# Patient Record
Sex: FEMALE | Race: WHITE | Employment: UNEMPLOYED | URBAN - METROPOLITAN AREA
[De-identification: names, ages, dates, MRNs, and addresses within clinical notes are randomized per-mention and may not be internally consistent; named-entity substitution may affect disease eponyms.]

---

## 2017-03-17 ENCOUNTER — HOSPITAL ENCOUNTER (OUTPATIENT)
Dept: PULMONOLOGY | Age: 47
Discharge: HOME | End: 2017-03-17
Attending: NURSE PRACTITIONER | Admitting: NURSE PRACTITIONER

## 2017-04-24 PROBLEM — J44.9 MODERATE COPD (CHRONIC OBSTRUCTIVE PULMONARY DISEASE) (HCC): Status: ACTIVE | Noted: 2017-04-24

## 2017-05-30 ASSESSMENT — COPD QUESTIONNAIRES: CAT_SEVERITY: MODERATE

## 2017-05-31 ENCOUNTER — HOSPITAL ENCOUNTER (OUTPATIENT)
Dept: SURGERY | Age: 47
Discharge: HOME | End: 2017-06-29
Attending: SPECIALIST | Admitting: SPECIALIST

## 2017-12-13 ENCOUNTER — OFFICE VISIT (OUTPATIENT)
Dept: PHYSICAL MEDICINE AND REHAB | Age: 47
End: 2017-12-13

## 2017-12-13 DIAGNOSIS — M79.606 LOWER EXTREMITY PAIN, DIFFUSE, UNSPECIFIED LATERALITY: ICD-10-CM

## 2017-12-13 DIAGNOSIS — M79.601 PARESTHESIA AND PAIN OF BOTH UPPER EXTREMITIES: ICD-10-CM

## 2017-12-13 DIAGNOSIS — R20.2 PARESTHESIA AND PAIN OF BOTH UPPER EXTREMITIES: ICD-10-CM

## 2017-12-13 DIAGNOSIS — G56.03 BILATERAL CARPAL TUNNEL SYNDROME: ICD-10-CM

## 2017-12-13 DIAGNOSIS — M54.17 LUMBOSACRAL RADICULOPATHY AT L5: Primary | ICD-10-CM

## 2017-12-13 DIAGNOSIS — M79.602 PARESTHESIA AND PAIN OF BOTH UPPER EXTREMITIES: ICD-10-CM

## 2017-12-13 PROCEDURE — 95886 MUSC TEST DONE W/N TEST COMP: CPT | Performed by: PHYSICAL MEDICINE & REHABILITATION

## 2017-12-13 PROCEDURE — 95912 NRV CNDJ TEST 11-12 STUDIES: CPT | Performed by: PHYSICAL MEDICINE & REHABILITATION

## 2017-12-31 NOTE — PROGRESS NOTES
EMG REPORT     CHIEF COMPLAINT: Pain, numbness and tingling of her arms and legs. HISTORY OF PRESENT ILLNESS: 56 y.o. R hand dominant female with years of 4 extremity numbness, tingling ands aching/stabbing pains. Her symptoms worsen with activity, but can occur at rest. She reports limb cramping at times, but no rashes or limb discoloration. She rated her pain severity as 10/10 \"often\". No recent traumas. No h/o DM or thyroid disorder. PHYSICAL EXAMINATION: Alert. Guarded spine and limb active movements. Give way with most MMT. No atrophy, tremor or clonus. Inconsistent reporting of sensory perception in the limbs. NERVE CONDUCTION STUDIES:     MOTOR         LATENCY NORMAL AMPLITUDE DISTANCE COND. DANG. RIGHT  MEDIAN 4.5 < 4.2 msec 6.8 8 cm 49   LEFT  MEDIAN 4.7 < 4.2 msec 9.4 8 cm >50   RIGHT  ULNAR 3.3 < 4.2 msec 11.3 8 cm 64   LEFT  ULNAR 3.3 < 4.2 msec 9.5 8 cm >50      SENSORY  ORTHODROMIC        LATENCY NORMAL AMPLITUDE DISTANCE   RIGHT MEDIAN 3.5 <2.3 msec 17 10 cm   LEFT  MEDIAN 2.8 < 2.3 msec 29 10 cm   RIGHT  ULNAR 2.6 < 2.3 msec 12 10 cm   LEFT  ULNAR 2.9 < 2.3 msec 9 10 cm      MOTOR         LATENCY NORMAL AMPLITUDE DISTANCE COND. DANG.    R  PERONEAL    < 6.2 msec  8 cm    L  PERONEAL 4.5 < 6.2 msec 3.2 8 cm 44   RIGHT  TIBIAL  < 6.2 msec  8 cm    LEFT TIBIAL 5.3 < 6.2 msec 1.4 8 cm 46   R TIB H REFLEX  < 50 msec      L TIB H REFLEX  < 50 msec         SENSORY  ANTIDROMIC        LATENCY NORMAL AMPLITUDE DISTANCE   R SUP PERONEAL  < 3.6 msec  10 cm   L SUP PERONEAL 4.4 < 3.6 msec 7 10 cm   RIGHT  SURAL 4.0 < 4.0 msec 10 14 cm   LEFT  SURAL 4.2 < 4.0 msec 12 14 cm         RIGHT   LEFT     Insertional Activity Spontaneous  Activity Volutional  MUAP's Insertional Activity Spontaneous  Activity Volutional  MUAP's   Lumbar paraspinals Normal None Normal Increased Occ Polys   Glut Med Normal None Normal Normal None Normal   Rect fem Normal None Normal Normal None Normal   Vast Med Normal None Normal Normal None Normal   Ant Tibialis Normal None Normal Normal None Dec#, Larger   EHL Normal None Normal Normal None Dec #, Larger   FDL Normal None Normal Normal None Dec #, Larger   Gastroc Normal None Normal Normal None Normal   EDB Normal None Normal Normal None Dec #, Larger   1st dors int Normal None Normal Normal None Normal       NEEDLE EMG:      RIGHT   LEFT     Insertional Activity Spontaneous  Activity Volutional  MUAP's Insertional Activity Spontaneous  Activity Volutional  MUAP's   Cerv Parasp Normal None Normal Normal None Normal   Infraspinatus Normal None Normal Normal None Normal   Deltoid   Normal None Normal Normal None Normal   Biceps   Normal None Normal Normal None Normal   Triceps   Normal None Normal Normal None Normal   Pronator Teres Normal None Normal Normal None Normal   Extensor Indices Normal None Normal Normal None Normal   1st Dorsal Interosseus Normal None Normal Normal None Normal   Abductor Pollicis Br. Normal None Normal Normal None Normal       FINDINGS:   EMG of the cervical and lumbar paraspinals demonstrated mild left lumbar paraspinal muscle membrane irritability. No limb positive waves or fibrillations were seen, however. Some motor unit remodeling was encountered throughout the left L5 myotome. The median sensory and motor latencies nearly corrected back to normal when temperature correction calculations were applied. All other latencies, evoked amplitudes and conduction velocities were acceptable. IMPRESSION:      1. Abnormal EMG. There is a chronic and electrically mild left L5 spinal nerve root injury (indolent left L5 radiculopathy). 2. 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 3. No signs of a plexopathy, generalized peripheral neuropathy or primary muscle disease.          Thank you for this interesting referral.

## 2018-01-09 ENCOUNTER — OFFICE VISIT (OUTPATIENT)
Dept: ORTHOPEDIC SURGERY | Age: 48
End: 2018-01-09

## 2018-01-09 VITALS — WEIGHT: 169 LBS | HEIGHT: 69 IN | RESPIRATION RATE: 16 BRPM | BODY MASS INDEX: 25.03 KG/M2

## 2018-01-09 DIAGNOSIS — G56.03 CARPAL TUNNEL SYNDROME, BILATERAL: Primary | ICD-10-CM

## 2018-01-09 DIAGNOSIS — R52 PAIN: ICD-10-CM

## 2018-01-09 PROCEDURE — 99242 OFF/OP CONSLTJ NEW/EST SF 20: CPT | Performed by: PHYSICIAN ASSISTANT

## 2018-01-09 PROCEDURE — 73100 X-RAY EXAM OF WRIST: CPT | Performed by: PHYSICIAN ASSISTANT

## 2018-01-09 RX ORDER — AMITRIPTYLINE HYDROCHLORIDE 25 MG/1
TABLET, FILM COATED ORAL
COMMUNITY
Start: 2018-01-03 | End: 2022-09-30 | Stop reason: ALTCHOICE

## 2018-01-09 ASSESSMENT — ENCOUNTER SYMPTOMS
GASTROINTESTINAL NEGATIVE: 1
BACK PAIN: 1
EYES NEGATIVE: 1
SHORTNESS OF BREATH: 1

## 2018-01-09 NOTE — PROGRESS NOTES
None Normal Normal None Normal         NEEDLE EMG:         RIGHT     LEFT       Insertional Activity Spontaneous  Activity Volutional  MUAP's Insertional Activity Spontaneous  Activity Volutional  MUAP's   Cerv Parasp Normal None Normal Normal None Normal   Infraspinatus Normal None Normal Normal None Normal   Deltoid Normal None Normal Normal None Normal   Biceps Normal None Normal Normal None Normal   Triceps Normal None Normal Normal None Normal   Pronator Teres Normal None Normal Normal None Normal   Extensor Indices Normal None Normal Normal None Normal   1st Dorsal Interosseus Normal None Normal Normal None Normal   Abductor Pollicis Br. Normal None Normal Normal None Normal         FINDINGS:      EMG of the cervical and lumbar paraspinals demonstrated mild left lumbar paraspinal muscle membrane irritability. No limb positive waves or fibrillations were seen, however. Some motor unit remodeling was encountered throughout the left L5 myotome. The median sensory and motor latencies nearly corrected back to normal when temperature correction calculations were applied. All other latencies, evoked amplitudes and conduction velocities were acceptable.        IMPRESSION:                  1. Abnormal EMG. There is a chronic and electrically mild left L5 spinal nerve root injury (indolent left L5 radiculopathy).                  2. 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                  3. No signs of a plexopathy, generalized peripheral neuropathy or primary muscle disease.       {Findings; xray shoulder ortho:64218}  {NUMBERS 1-4:89570} views of the {RIGHT LEFT BILATERAL:10822} {Joints:26427} show {Findings; injury xray:5769::\"no fracture, dislocation, swelling or degenerative changes noted\"}        Impression:  bilateral ***      Plan:    The most likely impression, expected course, diagnostic and treatment options were discussed, will proceed with:  {plan; knee pain:19630}      We greatly appreciate the referral from Martin Bourgeois NP, and will send a copy of this note to her office.

## 2019-06-26 ENCOUNTER — OFFICE VISIT (OUTPATIENT)
Dept: PHYSICAL MEDICINE AND REHAB | Age: 49
End: 2019-06-26
Payer: COMMERCIAL

## 2019-06-26 DIAGNOSIS — R20.2 PARESTHESIA AND PAIN OF BOTH UPPER EXTREMITIES: ICD-10-CM

## 2019-06-26 DIAGNOSIS — M79.602 PARESTHESIA AND PAIN OF BOTH UPPER EXTREMITIES: ICD-10-CM

## 2019-06-26 DIAGNOSIS — M79.601 PARESTHESIA AND PAIN OF BOTH UPPER EXTREMITIES: ICD-10-CM

## 2019-06-26 DIAGNOSIS — G56.03 BILATERAL CARPAL TUNNEL SYNDROME: Primary | ICD-10-CM

## 2019-06-26 PROCEDURE — 95886 MUSC TEST DONE W/N TEST COMP: CPT | Performed by: PHYSICAL MEDICINE & REHABILITATION

## 2019-06-26 PROCEDURE — 95911 NRV CNDJ TEST 9-10 STUDIES: CPT | Performed by: PHYSICAL MEDICINE & REHABILITATION

## 2019-06-26 NOTE — PROGRESS NOTES
EMG REPORT     CHIEF COMPLAINT: Hand and finger numbness with tingling and burning. HISTORY OF PRESENT ILLNESS: 51 y.o. right hand dominant female with about 3 years of intermittent, but recently progressive, hand and finger burning and stinging pain. She denied having significant neck pain radiating into her arms. She gets cramping in the hands and has difficulty sleeping because her right hand burns and stings. She has been dropping things from her right grasp. She has no similar symptoms in the legs. She rated her pain severity is 8/10. She denied having any rashes or limb discoloration. She has a history of Graves' disease with thyroid supplements now. There is no history of diabetes. PHYSICAL EXAMINATION: Alert. Cervical spine active rotation was free and full. Spurling's maneuver was negative. Upper limb reflexes were trace and symmetric. Tinel sign was negative. There was no upper limb weakness noted. Perception to light touch was mildly distorted in the thumb and index finger of both hands, more so on the right. There was no atrophy, tremor or clonus noted. NERVE CONDUCTION STUDIES:     MOTOR         LATENCY NORMAL AMPLITUDE DISTANCE COND. DANG. RIGHT  MEDIAN 4.5 < 4.2 msec 9.5 8 cm 43   LEFT  MEDIAN 4.1 < 4.2 msec 8.4 8 cm >50   RIGHT  ULNAR 2.9 < 4.2 msec 10.4 8 cm 59   LEFT  ULNAR 3.5 < 4.2 msec 7.8 8 cm >50      SENSORY  ORTHODROMIC        LATENCY NORMAL AMPLITUDE DISTANCE   RIGHT MEDIAN 3.2 <2.3 msec 11 10 cm   LEFT  MEDIAN 2.7 < 2.3 msec 18 10 cm   RIGHT  ULNAR 2.4 < 2.3 msec 9 10 cm   LEFT  ULNAR 2.5 < 2.3 msec 18 10 cm       Right dorsal ulnar sensory: dL 2.6 msec, amp 10 microV.         NEEDLE EMG:      RIGHT   LEFT     Insertional Activity Spontaneous  Activity Volutional  MUAP's Insertional Activity Spontaneous  Activity Volutional  MUAP's   Cerv Parasp Normal None Normal Normal None Normal   Infraspinatus Normal None Normal Normal None Normal   Deltoid   Normal None Normal Normal None Normal   Biceps   Normal None Normal Normal None Normal   Triceps   Normal None Normal Normal None Normal   Pronator Teres Normal None Normal Normal None Normal   Extensor Indicis Normal None Normal Normal None Normal   1st Dorsal Interosseus Normal None Normal Normal None Normal   Abductor Pollicis Br. Normal None Dec #, Larger Normal None Polys       FINDINGS:   EMG of the cervical paraspinals in both upper limbs demonstrated no paraspinal nor limb muscle membrane irritability. He diminished number of larger motor units were noted with needle exam of the right APB muscle. Subtle poly-phasicity was seen in the APB muscle of the left hand. Median sensory latency delays are seen at each wrist.  The right median motor latency was delayed in the form conduction velocity was slowed. Ulnar conductions were well-maintained. IMPRESSION:      1. Abnormal EMG. There are median neuropathies at each wrist (R>L mild CTS, more chronic on the right)     2. No evidence of a concurrent spinal nerve root injury (radiculopathy), plexopathy, generalized neuropathy or primary muscle disease.          Thank you for this interesting referral.

## 2019-06-26 NOTE — LETTER
June 26, 2019      Juan Mccain PA-C  [address] [facility], [address] [place] [address],[facility], [address] [facility],[address]        Patient Name: Jose L Olivera   MRN Number: [id number]   YOB: 1970   Date Of Visit: 06/26/2019        Dear Juan Mccain PA-C,       Thank you for referring Jose L Olivera to me for electrodiagnostic testing. Attached are the findings of the EMG and my assessment. If you have any further questions, please do not hesitate to call me. Thank you for this interesting referral.      Sincerely,          AKIRA Fried MD.

## 2019-10-22 ENCOUNTER — HOSPITAL ENCOUNTER (OUTPATIENT)
Dept: PHYSICAL THERAPY | Age: 49
Setting detail: THERAPIES SERIES
Discharge: HOME | End: 2019-10-22
Payer: COMMERCIAL

## 2019-10-22 PROCEDURE — 97112 NEUROMUSCULAR REEDUCATION: CPT | Performed by: PHYSICAL THERAPIST

## 2019-10-22 PROCEDURE — 97110 THERAPEUTIC EXERCISES: CPT | Performed by: PHYSICAL THERAPIST

## 2019-10-22 PROCEDURE — 97161 PT EVAL LOW COMPLEX 20 MIN: CPT | Performed by: PHYSICAL THERAPIST

## 2019-10-22 ASSESSMENT — PAIN DESCRIPTION - LOCATION: LOCATION: BACK

## 2019-10-22 ASSESSMENT — PAIN DESCRIPTION - FREQUENCY: FREQUENCY: CONTINUOUS

## 2019-10-22 ASSESSMENT — PAIN DESCRIPTION - ORIENTATION: ORIENTATION: LEFT

## 2019-10-22 ASSESSMENT — PAIN SCALES - GENERAL: PAINLEVEL_OUTOF10: 8

## 2019-10-22 ASSESSMENT — PAIN DESCRIPTION - DESCRIPTORS: DESCRIPTORS: SHARP;ACHING;NUMBNESS;TINGLING

## 2019-10-22 ASSESSMENT — PAIN DESCRIPTION - PROGRESSION: CLINICAL_PROGRESSION: GRADUALLY WORSENING

## 2019-10-22 ASSESSMENT — PAIN DESCRIPTION - ONSET: ONSET: AWAKENED FROM SLEEP

## 2019-10-22 ASSESSMENT — PAIN DESCRIPTION - PAIN TYPE: TYPE: CHRONIC PAIN

## 2019-10-22 ASSESSMENT — PAIN - FUNCTIONAL ASSESSMENT: PAIN_FUNCTIONAL_ASSESSMENT: PREVENTS OR INTERFERES WITH MANY ACTIVE NOT PASSIVE ACTIVITIES

## 2019-10-28 ENCOUNTER — HOSPITAL ENCOUNTER (OUTPATIENT)
Dept: PHYSICAL THERAPY | Age: 49
Discharge: HOME | End: 2019-10-28

## 2019-11-04 ENCOUNTER — HOSPITAL ENCOUNTER (OUTPATIENT)
Dept: PHYSICAL THERAPY | Age: 49
Setting detail: THERAPIES SERIES
Discharge: HOME | End: 2019-11-04
Payer: COMMERCIAL

## 2019-11-04 PROCEDURE — 97140 MANUAL THERAPY 1/> REGIONS: CPT

## 2019-11-04 PROCEDURE — 97110 THERAPEUTIC EXERCISES: CPT

## 2019-11-18 ENCOUNTER — HOSPITAL ENCOUNTER (OUTPATIENT)
Dept: PHYSICAL THERAPY | Age: 49
Discharge: HOME | End: 2019-11-18

## 2019-11-22 ENCOUNTER — HOSPITAL ENCOUNTER (OUTPATIENT)
Dept: PHYSICAL THERAPY | Age: 49
Discharge: HOME | End: 2019-11-22

## 2019-11-25 ENCOUNTER — HOSPITAL ENCOUNTER (OUTPATIENT)
Dept: PHYSICAL THERAPY | Age: 49
Setting detail: THERAPIES SERIES
Discharge: HOME | End: 2019-11-25
Payer: COMMERCIAL

## 2019-11-25 PROCEDURE — 97110 THERAPEUTIC EXERCISES: CPT

## 2019-11-25 PROCEDURE — 97530 THERAPEUTIC ACTIVITIES: CPT

## 2019-11-25 PROCEDURE — 97140 MANUAL THERAPY 1/> REGIONS: CPT

## 2019-12-02 ENCOUNTER — HOSPITAL ENCOUNTER (OUTPATIENT)
Dept: PHYSICAL THERAPY | Age: 49
Discharge: HOME | End: 2019-12-02

## 2019-12-06 ENCOUNTER — HOSPITAL ENCOUNTER (OUTPATIENT)
Dept: PHYSICAL THERAPY | Age: 49
Setting detail: THERAPIES SERIES
Discharge: HOME | End: 2019-12-06
Payer: COMMERCIAL

## 2019-12-06 PROCEDURE — 97113 AQUATIC THERAPY/EXERCISES: CPT

## 2019-12-12 ENCOUNTER — HOSPITAL ENCOUNTER (OUTPATIENT)
Dept: PHYSICAL THERAPY | Age: 49
Setting detail: THERAPIES SERIES
Discharge: HOME | End: 2019-12-12
Payer: COMMERCIAL

## 2019-12-12 PROCEDURE — 97110 THERAPEUTIC EXERCISES: CPT

## 2019-12-13 ENCOUNTER — HOSPITAL ENCOUNTER (OUTPATIENT)
Dept: PHYSICAL THERAPY | Age: 49
Setting detail: THERAPIES SERIES
Discharge: HOME | End: 2019-12-13
Payer: COMMERCIAL

## 2019-12-13 PROCEDURE — 97113 AQUATIC THERAPY/EXERCISES: CPT

## 2019-12-16 ENCOUNTER — HOSPITAL ENCOUNTER (OUTPATIENT)
Dept: PHYSICAL THERAPY | Age: 49
Setting detail: THERAPIES SERIES
Discharge: HOME | End: 2019-12-16
Payer: COMMERCIAL

## 2019-12-16 PROCEDURE — 97140 MANUAL THERAPY 1/> REGIONS: CPT

## 2019-12-16 PROCEDURE — 97110 THERAPEUTIC EXERCISES: CPT

## 2019-12-16 PROCEDURE — 97112 NEUROMUSCULAR REEDUCATION: CPT

## 2019-12-23 ENCOUNTER — HOSPITAL ENCOUNTER (OUTPATIENT)
Dept: PHYSICAL THERAPY | Age: 49
Discharge: HOME | End: 2019-12-23

## 2019-12-30 ENCOUNTER — HOSPITAL ENCOUNTER (OUTPATIENT)
Dept: PHYSICAL THERAPY | Age: 49
Discharge: HOME | End: 2019-12-30

## 2020-01-06 ENCOUNTER — HOSPITAL ENCOUNTER (OUTPATIENT)
Dept: PHYSICAL THERAPY | Age: 50
Discharge: HOME | End: 2020-01-06

## 2020-06-07 NOTE — PROGRESS NOTES
Zoomed and updated mother   circumstances is:  [x]Appropriate   []Inappropriate    Gait and Station:   Gait is:  [x]Normal  []Antalgic   []Trendelenburg   []Wide   []Unsteady   []Other:  Assistive Device:  []Cane    []Crutches    []Walker    []Wheelchair    []Gurney  Weight bearing on injured extremity:  [x]Is able   []Is not able      ORTHOPEDIC WRIST EXAM:     WRIST EXAM:  [x]Right []Left  The patient is:  []Right Handed    []Left Handed  Circulation:  [x] The limb is warm and well perfused. [x] Capillary refill is intact. [] Edema:      Inspection:  [x] Skin intact without abrasion or lacerations. [x] Normal wrist alignment:  [] Abnormal alignment:  [] Ecchymosis:  [] Abrasion:  [] Laceration:   [] Scar / Surgical incision:  [] Orthopedic appliance:     Range of Motion:  [x] Normal Wrist AROM     [x] Normal Wrist PROM  [x]Can make a fist    [x]Thumb tip can touch pinky tip  [] Deferred due to fracture  Active Wrist Flexion:  []AROM = PROM   Active Wrist Extension:  []AROM = PROM   Active Radial Deviation:  []AROM = PROM   Active Ulnar Deviation:  []AROM = PROM   Active Wrist Supination  []AROM = PROM   Active Wrist Pronation  []AROM = PROM   Passive Wrist Flexion:  []AROM = PROM   Passive Wrist Extension:  []AROM = PROM   Passive Radial Deviation:  []AROM = PROM   Passive Ulnar Deviation:  []AROM = PROM   Passive Wrist Supination:  []AROM = PROM   Passive Wrist Pronation:  []AROM = PROM     Motor Function:  [x] No gross motor weakness of wrist [x] No gross motor weakness of hand  [x]Thumbs Up    [x]Pointer finger    [x]OK sign    [x]Cross fingers    [x]Number 5  [] Motor weakness:      Neurologic:  [x] Sensation to light touch intact. [] Impaired:  [] Decreased sensation to light touch over median nerve distribution. [] Decreased sensation to light touch over ulnar nerve distribution. [x] Deep tendon reflexes intact. [] Impaired:  [x] Coordination / proprioception intact.   [] Impaired:     Palpation: (Not tested if not marked) Normal Tenderness Swelling Crepitation Calor   Circumferential Wrist: [x] [] [] [] []   Distal Radius: [x] [] [] [] []   Eric's Tubercle: [] [] [] [] []   Radial Styloid: [x] [] [] [] []   First Dorsal Compartment: [] [] [] [] []   Distal Ulna: [x] [] [] [] []   Ulna Styloid: [x] [] [] [] []   TFCC: [] [] [] [] []   DRUJ: [] [] [] [] []   Snuff Box: [] [] [] [] []   Carpal: [] [x] [] [] []   Metacarpal: [] [] [] [] []   Other: [] [] [] [] []   Other: [] [] [] [] []     Comment:     Provocative Tests: (Not tested if not marked)   Negative Positive Positive Findings   DRUJ Piano Chavarria Sign: [] []    Tinel's Sign [x] []    Phalen's Sign: [] [x]    Carpal Compression: [] [x]    TFC Compression Test: [] []    Thumb CMC Grind Test: [] []    Finklestein's test: [] []    Froment's sign: [] []    Other: [] []        MEDICAL DATA:   Outside record review:  EMG report from Dr. Mikael Keyes MD.    Imaging:  Wrist x-ray:  2 view(s) of the right wrist were obtained and reviewed and show Right Wrist: No fracture, Normal alignment, No foreign body                                                                                                                                                                                                           ORTHOPEDIC WRIST EXAM:     WRIST EXAM:  []Right [x]Left  The patient is:  []Right Handed    []Left Handed  Circulation:  [x] The limb is warm and well perfused. [x] Capillary refill is intact. [] Edema:      Inspection:  [x] Skin intact without abrasion or lacerations.   [x] Normal wrist alignment:  [] Abnormal alignment:  [] Ecchymosis:  [] Abrasion:  [] Laceration:   [] Scar / Surgical incision:  [] Orthopedic appliance:     Range of Motion:  [x] Normal Wrist AROM     [x] Normal Wrist PROM  [x]Can make a fist    [x]Thumb tip can touch pinky tip  [] Deferred due to fracture  Active Wrist Flexion:  []AROM = PROM   Active Wrist Extension:  []AROM = PROM   Active Radial Deviation:  []AROM msec 6.8 8 cm 49   LEFT  MEDIAN 4.7 < 4.2 msec 9.4 8 cm >50   RIGHT  ULNAR 3.3 < 4.2 msec 11.3 8 cm 64   LEFT  ULNAR 3.3 < 4.2 msec 9.5 8 cm >50       SENSORY  ORTHODROMIC             LATENCY NORMAL AMPLITUDE DISTANCE   RIGHT MEDIAN 3.5 <2.3 msec 17 10 cm   LEFT  MEDIAN 2.8 < 2.3 msec 29 10 cm   RIGHT  ULNAR 2.6 < 2.3 msec 12 10 cm   LEFT  ULNAR 2.9 < 2.3 msec 9 10 cm       MOTOR               LATENCY NORMAL AMPLITUDE DISTANCE COND. DANG.    R  PERONEAL     < 6.2 msec   8 cm     L  PERONEAL 4.5 < 6.2 msec 3.2 8 cm 44   RIGHT  TIBIAL   < 6.2 msec   8 cm     LEFT TIBIAL 5.3 < 6.2 msec 1.4 8 cm 46   R TIB H REFLEX   < 50 msec         L TIB H REFLEX   < 50 msec             SENSORY  ANTIDROMIC             LATENCY NORMAL AMPLITUDE DISTANCE   R SUP PERONEAL   < 3.6 msec   10 cm   L SUP PERONEAL 4.4 < 3.6 msec 7 10 cm   RIGHT  SURAL 4.0 < 4.0 msec 10 14 cm   LEFT  SURAL 4.2 < 4.0 msec 12 14 cm             RIGHT     LEFT       Insertional Activity Spontaneous  Activity Volutional  MUAP's Insertional Activity Spontaneous  Activity Volutional  MUAP's   Lumbar paraspinals Normal None Normal Increased Occ Polys   Glut Med Normal None Normal Normal None Normal   Rect fem Normal None Normal Normal None Normal   Vast Med Normal None Normal Normal None Normal   Ant Tibialis Normal None Normal Normal None Dec#, Larger   EHL Normal None Normal Normal None Dec #, Larger   FDL Normal None Normal Normal None Dec #, Larger   Gastroc Normal None Normal Normal None Normal   EDB Normal None Normal Normal None Dec #, Larger   1st dors int Normal None Normal Normal None Normal         NEEDLE EMG:         RIGHT     LEFT       Insertional Activity Spontaneous  Activity Volutional  MUAP's Insertional Activity Spontaneous  Activity Volutional  MUAP's   Cerv Parasp Normal None Normal Normal None Normal   Infraspinatus Normal None Normal Normal None Normal   Deltoid Normal None Normal Normal None Normal   Biceps Normal None Normal Normal None Normal Triceps Normal None Normal Normal None Normal   Pronator Teres Normal None Normal Normal None Normal   Extensor Indices Normal None Normal Normal None Normal   1st Dorsal Interosseus Normal None Normal Normal None Normal   Abductor Pollicis Br. Normal None Normal Normal None Normal         FINDINGS:      EMG of the cervical and lumbar paraspinals demonstrated mild left lumbar paraspinal muscle membrane irritability. No limb positive waves or fibrillations were seen, however. Some motor unit remodeling was encountered throughout the left L5 myotome. The median sensory and motor latencies nearly corrected back to normal when temperature correction calculations were applied. All other latencies, evoked amplitudes and conduction velocities were acceptable.        IMPRESSION:                  1. Abnormal EMG. There is a chronic and electrically mild left L5 spinal nerve root injury (indolent left L5 radiculopathy).                  2. 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                3. No signs of a plexopathy, generalized peripheral neuropathy or primary muscle disease.            Imaging:  Wrist x-ray:  2 view(s) of the left wrist were obtained and reviewed and show Left Wrist: No fracture, Normal alignment, No foreign body      ASSESSMENT:     1. Carpal tunnel syndrome, bilateral     2. Pain  XR WRIST RIGHT (2 VIEWS)    XR WRIST LEFT (MIN 3 VIEWS)         PLAN:   · Diagnostic and treatment options discussed. Diagnosis explained. Natural history discussed. Patient's questions answered. · Night braces fitted and applied. · Continue Motrin as previously prescribed. · Follow-up in 1 month for recheck. We greatly appreciate the referral from Elana Martin CNP, and will send a copy of this note to her office.       Jose Martin Seals PA-C

## 2021-08-09 RX ORDER — PROGESTERONE 200 MG/1
CAPSULE ORAL
Qty: 30 CAPSULE | Refills: 0 | Status: SHIPPED | OUTPATIENT
Start: 2021-08-09 | End: 2021-09-07 | Stop reason: SDUPTHER

## 2021-08-09 RX ORDER — ESTRADIOL 1 MG/1
1 TABLET ORAL DAILY
Qty: 30 TABLET | Refills: 0 | Status: SHIPPED | OUTPATIENT
Start: 2021-08-09 | End: 2021-09-07 | Stop reason: SDUPTHER

## 2021-08-09 RX ORDER — PROGESTERONE 200 MG/1
CAPSULE ORAL
COMMUNITY
Start: 2021-07-10 | End: 2021-08-09 | Stop reason: SDUPTHER

## 2021-08-09 RX ORDER — ESTRADIOL 1 MG/1
1 TABLET ORAL DAILY
COMMUNITY
Start: 2021-08-09 | End: 2021-08-09 | Stop reason: SDUPTHER

## 2021-08-09 NOTE — TELEPHONE ENCOUNTER
Pt requesting 1 time refills on Estradiol and Progesterone to get her through until her REBECCA 9/7/21.

## 2021-09-07 ENCOUNTER — OFFICE VISIT (OUTPATIENT)
Dept: OBGYN | Age: 51
End: 2021-09-07
Payer: COMMERCIAL

## 2021-09-07 ENCOUNTER — HOSPITAL ENCOUNTER (OUTPATIENT)
Age: 51
Setting detail: SPECIMEN
Discharge: HOME | End: 2021-09-07
Payer: COMMERCIAL

## 2021-09-07 VITALS
WEIGHT: 159 LBS | HEIGHT: 69 IN | SYSTOLIC BLOOD PRESSURE: 130 MMHG | BODY MASS INDEX: 23.55 KG/M2 | DIASTOLIC BLOOD PRESSURE: 86 MMHG | HEART RATE: 76 BPM

## 2021-09-07 DIAGNOSIS — N95.1 MENOPAUSAL SYMPTOMS: ICD-10-CM

## 2021-09-07 DIAGNOSIS — Z01.419 WOMEN'S ANNUAL ROUTINE GYNECOLOGICAL EXAMINATION: Primary | ICD-10-CM

## 2021-09-07 DIAGNOSIS — Z87.410 HISTORY OF CERVICAL DYSPLASIA: ICD-10-CM

## 2021-09-07 DIAGNOSIS — Z11.51 SPECIAL SCREENING EXAMINATION FOR HUMAN PAPILLOMAVIRUS (HPV): ICD-10-CM

## 2021-09-07 DIAGNOSIS — Z12.31 SCREENING MAMMOGRAM, ENCOUNTER FOR: ICD-10-CM

## 2021-09-07 PROCEDURE — 99396 PREV VISIT EST AGE 40-64: CPT | Performed by: OBSTETRICS & GYNECOLOGY

## 2021-09-07 PROCEDURE — 88142 CYTOPATH C/V THIN LAYER: CPT

## 2021-09-07 PROCEDURE — 87624 HPV HI-RISK TYP POOLED RSLT: CPT

## 2021-09-07 RX ORDER — ESTRADIOL 1 MG/1
1 TABLET ORAL DAILY
Qty: 90 TABLET | Refills: 3 | Status: SHIPPED | OUTPATIENT
Start: 2021-09-07 | End: 2022-09-01 | Stop reason: SDUPTHER

## 2021-09-07 RX ORDER — PROGESTERONE 200 MG/1
CAPSULE ORAL
Qty: 90 CAPSULE | Refills: 3 | Status: SHIPPED | OUTPATIENT
Start: 2021-09-07 | End: 2022-09-01 | Stop reason: SDUPTHER

## 2021-09-07 RX ORDER — LOSARTAN POTASSIUM 50 MG/1
50 TABLET ORAL DAILY
COMMUNITY

## 2021-09-07 RX ORDER — LEVOTHYROXINE SODIUM 0.12 MG/1
125 TABLET ORAL DAILY
COMMUNITY
End: 2022-09-30 | Stop reason: ALTCHOICE

## 2021-09-07 SDOH — ECONOMIC STABILITY: FOOD INSECURITY: WITHIN THE PAST 12 MONTHS, YOU WORRIED THAT YOUR FOOD WOULD RUN OUT BEFORE YOU GOT MONEY TO BUY MORE.: NEVER TRUE

## 2021-09-07 SDOH — ECONOMIC STABILITY: FOOD INSECURITY: WITHIN THE PAST 12 MONTHS, THE FOOD YOU BOUGHT JUST DIDN'T LAST AND YOU DIDN'T HAVE MONEY TO GET MORE.: NEVER TRUE

## 2021-09-07 ASSESSMENT — PATIENT HEALTH QUESTIONNAIRE - PHQ9
SUM OF ALL RESPONSES TO PHQ QUESTIONS 1-9: 0
SUM OF ALL RESPONSES TO PHQ QUESTIONS 1-9: 0
1. LITTLE INTEREST OR PLEASURE IN DOING THINGS: 0
SUM OF ALL RESPONSES TO PHQ QUESTIONS 1-9: 0
2. FEELING DOWN, DEPRESSED OR HOPELESS: 0
SUM OF ALL RESPONSES TO PHQ9 QUESTIONS 1 & 2: 0

## 2021-09-07 ASSESSMENT — ENCOUNTER SYMPTOMS
EYES NEGATIVE: 1
GASTROINTESTINAL NEGATIVE: 1
ALLERGIC/IMMUNOLOGIC NEGATIVE: 1
RESPIRATORY NEGATIVE: 1

## 2021-09-07 ASSESSMENT — SOCIAL DETERMINANTS OF HEALTH (SDOH): HOW HARD IS IT FOR YOU TO PAY FOR THE VERY BASICS LIKE FOOD, HOUSING, MEDICAL CARE, AND HEATING?: NOT HARD AT ALL

## 2021-09-07 NOTE — PROGRESS NOTES
9/7/21    [facility]  1970    Chief Complaint   Patient presents with    Gynecologic Exam     annual exam, postmenopausal on Estradiol, currently sexually active, pap 2020 negative, hx. of dysplasia cryo 2017, mammo 2019 negative, never had a dexa, colonoscopy 2015 normal. no c/o . ns        The patient is a 49 y.o. female, No obstetric history on file. who presents for her annual exam.  She is menopausal.  She is taking HRT, estradiol 1mg. and prometrium 200mg She is  sexually active. She reports no gynecological symptoms. Pap smear history: Her last PAP smear was in 2019. Her results were normal.    Breast history: her most recent mammogram was in 2020. The results were: Normal    Osteoporosis Status: she has not had a bone density scan    Colonoscopy Status: she had a colonoscopy in 6 years prior. The results were normal.    Past Medical History:   Diagnosis Date    Abnormal Pap smear of cervix     mild dysplasia, cryo 2017     Arthritis     back    COPD (chronic obstructive pulmonary disease) (HCC)     Fibromyalgia     Graves disease     Hypertension        Past Surgical History:   Procedure Laterality Date    COLONOSCOPY      GYNECOLOGIC CRYOSURGERY      THYROIDECTOMY      TUBAL LIGATION         No family history on file.     Social History     Tobacco Use    Smoking status: Current Every Day Smoker     Packs/day: 1.00     Last attempt to quit: 4/18/2018     Years since quitting: 3.3    Smokeless tobacco: Never Used   Substance Use Topics    Alcohol use: Yes     Comment: occas    Drug use: Not on file       Current Outpatient Medications   Medication Sig Dispense Refill    losartan (COZAAR) 50 MG tablet Take 50 mg by mouth daily      levothyroxine (SYNTHROID) 125 MCG tablet Take 125 mcg by mouth Daily      estradiol (ESTRACE) 1 MG tablet Take 1 tablet by mouth daily 90 tablet 3    progesterone (PROMETRIUM) 200 MG CAPS capsule TAKE 1 CAPSULE BY MOUTH ONCE DAILY 90 capsule 3    DULoxetine (CYMBALTA) 30 MG extended release capsule Take 30 mg by mouth daily      fluticasone (FLOVENT HFA) 110 MCG/ACT inhaler Inhale 2 puffs into the lungs 2 times daily 1 Inhaler 5    fluticasone (FLOVENT HFA) 110 MCG/ACT inhaler Inhale 2 puffs into the lungs 2 times daily 1 Inhaler 5    cyclobenzaprine (FLEXERIL) 5 MG tablet Take 5 mg by mouth 3 times daily as needed for Muscle spasms      ibuprofen (ADVIL;MOTRIN) 800 MG tablet Take 800 mg by mouth every 6 hours as needed for Pain      albuterol sulfate HFA (VENTOLIN HFA) 108 (90 Base) MCG/ACT inhaler Inhale 2 puffs into the lungs every 6 hours (Patient not taking: Reported on 9/7/2021) 1 Inhaler 5    amitriptyline (ELAVIL) 25 MG tablet  (Patient not taking: Reported on 9/7/2021)      norethindrone (JUAN MIGUEL) 0.35 MG tablet Take 1 tablet by mouth daily (Patient not taking: Reported on 9/7/2021)      albuterol sulfate HFA (VENTOLIN HFA) 108 (90 Base) MCG/ACT inhaler Inhale 2 puffs into the lungs every 6 hours as needed for Wheezing  (Patient not taking: Reported on 9/7/2021)      methocarbamol (ROBAXIN) 750 MG tablet Take 750 mg by mouth 4 times daily (Patient not taking: Reported on 9/7/2021)       No current facility-administered medications for this visit. No Known Allergies    No obstetric history on file. There is no immunization history on file for this patient. Review of Systems   Constitutional: Negative. Eyes: Negative. Respiratory: Negative. Cardiovascular: Negative. Gastrointestinal: Negative. Endocrine: Negative. Genitourinary: Negative. Musculoskeletal: Negative. Skin: Negative. Allergic/Immunologic: Negative. Neurological: Negative. Hematological: Negative. Psychiatric/Behavioral: Negative. /86   Pulse 76   Ht 5' 9\" (1.753 m)   Wt 159 lb (72.1 kg)   BMI 23.48 kg/m²     Physical Exam  Exam conducted with a chaperone present.    Constitutional:       Appearance: Normal appearance. HENT:      Head: Normocephalic and atraumatic. Nose: Nose normal.   Eyes:      Conjunctiva/sclera: Conjunctivae normal.   Cardiovascular:      Rate and Rhythm: Normal rate. Pulses: Normal pulses. Pulmonary:      Effort: Pulmonary effort is normal.   Chest:      Breasts:         Right: Normal.         Left: Normal.   Abdominal:      General: Abdomen is flat. Bowel sounds are normal.      Palpations: Abdomen is soft. Hernia: There is no hernia in the left inguinal area or right inguinal area. Genitourinary:     General: Normal vulva. Exam position: Lithotomy position. Labia:         Right: No rash, tenderness or lesion. Left: No rash, tenderness or lesion. Urethra: No prolapse. Vagina: Normal. No foreign body. No vaginal discharge, erythema, tenderness, bleeding, lesions or prolapsed vaginal walls. Cervix: No cervical motion tenderness, discharge, friability, lesion, erythema or cervical bleeding. Uterus: Normal. Not enlarged, not tender and no uterine prolapse. Adnexa: Right adnexa normal and left adnexa normal.        Right: No mass, tenderness or fullness. Left: No mass, tenderness or fullness. Musculoskeletal:      Cervical back: Normal range of motion and neck supple. Lymphadenopathy:      Upper Body:      Right upper body: No supraclavicular, axillary or pectoral adenopathy. Left upper body: No supraclavicular, axillary or pectoral adenopathy. Skin:     General: Skin is warm. Coloration: Skin is not ashen or cyanotic. Findings: No abrasion, abscess or bruising. Rash is not crusting or macular. Neurological:      Mental Status: She is alert and oriented to person, place, and time. No results found for this visit on 09/07/21. Assessment and Plan   Diagnosis Orders   1. Women's annual routine gynecological examination  PAP SMEAR   2.  Special screening examination for human papillomavirus (HPV) PAP SMEAR   3. History of cervical dysplasia  PAP SMEAR   4. Menopausal symptoms  estradiol (ESTRACE) 1 MG tablet    progesterone (PROMETRIUM) 200 MG CAPS capsule   5. Screening mammogram, encounter for  PARTHA DIGITAL SCREEN W OR WO CAD BILATERAL     Diet and exercise  Risk of hrt including breast cancer and vte  Return in about 1 year (around 9/7/2022).     Magalie Hernandez MD

## 2021-09-07 NOTE — PATIENT INSTRUCTIONS
Hormone Replacement Therapy (HRT)       Hormone replacement therapy can be either estrogen alone (called estrogen replacement therapy, or ERT), or estrogen and progesterone combined. This combination is referred to as hormone replacement therapy (HRT). Progesterone is usually given in the form of progestins, which are synthetic forms of the naturally occurring hormone progesterone. While once widely used, HRT now has a more limited role because of concerns about its safety. Medications and Their Commonly Used Names   Estrogen is most commonly given in these forms:   Pill or tablet   Vaginal cream   Vaginal ring insert   Patch   Skin gel   Progestin is available in these forms:   Pill (can be combined with estrogen)   Intrauterine device (IUD)   Vaginal capsule   Injection   Implant   Skin gel   What This Medication Is Prescribed For   To Ease Menopausal Symptoms   Hot flashes   Sleep disturbance   Vaginal dryness   To Reduce the Risk of Osteoporosis and Related Fractures   Estrogen is important for bone health. When the natural supply of estrogen drops off with menopause, HRT can help protect bones by replacing estrogen. How This Medication Works   The hormones provided with HRT are meant to replace the natural hormones that a woman's body no longer produces after menopause. Estrogen is involved in many functions in the body, and therefore, HRT is believed to provide the following benefits:   Reduce the symptoms of menopause   Helps to slow or prevent the bone loss that occurs with aging and increases after menopause, in order to help delay osteoporosis   Helps to reduce the risk of colorectal cancer   Precautions While Using This Medication   Long-term use of HRT (estrogen plus progestin) may significantly increase women's risks of breast cancer , strokes , heart attacks , and blood clots. ERT may also increase the risk of ovarian cancer .  HRT has been associated with an increased risk of gastroesophageal reflux disease (GERD). For many women the risks of HRTespecially when used long-termmay outweigh the benefits, so the decision to use HRT should be carefully considered and discussed with your healthcare provider. Women with the following conditions are usually advised not to take HRT:   Unexplained vaginal bleeding   Liver disease   Kidney disease   High levels of triglycerides (a type of fat in the blood)   History of blood clots in the veins   History of breast or ovarian cancer   History of cardiovascular disease   History of stroke   many other conditions (ask your doctor if any of your medical conditions increase the risks of taking HRT)   Proper Usage and Missed Dose   You and your doctor will determine the dosing schedule that is best for you. You should see your doctor at least once a year while taking HRT to discuss the effects and review your decision. The U[place] Food and Drug Administration (FDA) recommends that women who decide to use HRT for menopausal symptoms use the lowest possible dose for the shortest time needed. There are two general schedules for taking HRT in pill form:   Cyclic or sequentialEstrogen is taken every day for a set number of days. A higher dose (than that used in continuous doses) of progestin is given for 10-14 days. One or both hormones are stopped for a specified period of time. This pattern is repeated every month, and it causes regular monthly bleeding like a light menstrual period. ContinuousLow-dose estrogen and progestin are taken together every day of the month without any break. Vaginal bleeding often occurs, sometimes for up to a year when this schedule is first started, and can vary from light spotting to irregular menstrual-type bleeding. Take estrogen at the same time every day to minimize side effects. If you are using an estrogen skin patch, be sure to read the application directions carefully before using.    Missed Dose   Pill formIf you miss a dose, take it as soon as possible. However, if it is almost time for your next dose, skip the missed dose and go back to your regular dosing schedule. Do not double doses. Skin patchIf you forget to apply a new patch when you are supposed to, apply it as soon as possible. However, if it is almost time for the next patch, skip the missed one and go back to your regular schedule. Always remove the old patch before applying a new one. Do not apply more than one patch at a time. Possible Side Effects   The following side effects may disappear over time as your body adjusts to taking HRT. Also, your doctor may be able to change the amount of hormone you receive, the way it is taken, or the timing of the dose, in order to help minimize these effects:   Bloating   Breast tenderness   Cramping   Irritability   Depression   Return of monthly periods   Swelling of feet and lower legs   Rapid weight gain   HRT can also cause some very serious side effects. You should discuss your specific health status and risks with your doctor when deciding whether or not to use HRT. These serious side effects include the following:   Endometrial Cancer   For a woman who has not had her uterus removed (via a hysterectomy ), taking estrogen alone (ERT) can lead to cancer of the endometrium (the lining of the uterus). However, this risk can be avoided by taking both estrogen and progestin in the form of HRT. A woman who has had her uterus removed cannot develop endometrial cancer so she can take ERT. Breast Cancer   Some studies have suggested that women who take HRT and ERT are at greater risk for developing breast cancer. A major study on HRT, the [facility], found that invasive breast cancer was more common among women on long-term HRT. Blood Clots   Both HRT and ERT slightly increase the risk of developing blood clots in veins.  In the [facility] study, women who were long-term users of HRT had twice the number of blood clots as the women who were not taking HRT. Cardiovascular Disease   Although HRT was previously believed to reduce the risk of cardiovascular disease, it appears that long-term use of HRT may actually increase this risk. In the [facility] study on HRT, women on long-term HRT had increased risk of heart disease and strokes. Ovarian Cancer   Women on ERT may be at higher risk of ovarian cancer. Other Uses for This Medication   To treat the following:   Osteoporosis caused by lack of estrogen before menopause   Moran's syndrome (a genetic disease)   With every medicine, there are important precautions to consider. These include allergies, interactions with other drugs and medical conditions, and safety in certain age groups.

## 2021-09-10 LAB
HPV HIGH RISK: NOT DETECTED
HPV, GENOTYPE 16: NOT DETECTED
HPV, GENOTYPE 18: NOT DETECTED

## 2022-08-29 ASSESSMENT — PAIN DESCRIPTION - LOCATION: LOCATION: BACK

## 2022-08-29 ASSESSMENT — PAIN DESCRIPTION - PAIN TYPE: TYPE: CHRONIC PAIN

## 2022-08-30 ENCOUNTER — HOSPITAL ENCOUNTER (OUTPATIENT)
Dept: PHYSICAL THERAPY | Age: 52
Setting detail: THERAPIES SERIES
Discharge: HOME | End: 2022-08-30

## 2022-09-01 DIAGNOSIS — N95.1 MENOPAUSAL SYMPTOMS: ICD-10-CM

## 2022-09-01 RX ORDER — PROGESTERONE 200 MG/1
CAPSULE ORAL
Qty: 90 CAPSULE | Refills: 3 | Status: SHIPPED | OUTPATIENT
Start: 2022-09-01 | End: 2022-09-27 | Stop reason: SDUPTHER

## 2022-09-01 RX ORDER — ESTRADIOL 1 MG/1
1 TABLET ORAL DAILY
Qty: 90 TABLET | Refills: 3 | Status: SHIPPED | OUTPATIENT
Start: 2022-09-01 | End: 2022-09-27 | Stop reason: SDUPTHER

## 2022-09-13 ENCOUNTER — HOSPITAL ENCOUNTER (OUTPATIENT)
Dept: PHYSICAL THERAPY | Age: 52
Setting detail: THERAPIES SERIES
Discharge: HOME | End: 2022-09-13
Payer: COMMERCIAL

## 2022-09-13 PROCEDURE — 97161 PT EVAL LOW COMPLEX 20 MIN: CPT

## 2022-09-13 ASSESSMENT — PAIN DESCRIPTION - LOCATION: LOCATION: BACK

## 2022-09-13 ASSESSMENT — PAIN SCALES - GENERAL: PAINLEVEL_OUTOF10: 8

## 2022-09-13 ASSESSMENT — PAIN DESCRIPTION - DESCRIPTORS: DESCRIPTORS: ACHING;SHARP;BURNING

## 2022-09-13 ASSESSMENT — PAIN DESCRIPTION - ONSET: ONSET: AWAKENED FROM SLEEP

## 2022-09-13 ASSESSMENT — PAIN DESCRIPTION - ORIENTATION: ORIENTATION: LEFT

## 2022-09-13 NOTE — PLAN OF CARE
weeks     [] 4 days   [] 4 weeks [] 8 weeks         [] 9 weeks [] 10 weeks         [] 11 weeks [] 12 weeks    Rehab Potential/Progress: [] Excellent [x] Good [] Fair  [] Poor     Goals:    Patient goals: 6 treatments  Short term goals  Time Frame for Short term goals: The patient will increase left hip strength to > or = to 4/5     Patient will be able to walk > 1000 ft during 6 minute walk test  Patient will be able to correctly use straight cane during ambulation for long distance walks  Patint will be independent in HEP     Long Term Goals  Time Frame for Long Term Goals: 12 treatments  Patient will be able to walk > 1500 feet during 6 minute walk test  Patient will increase left LE strength to > or = to 4+/5  Patient will be independent in the management of her LBP / radicular symptoms  Patient will decrease Oswestry low back pain score to < or = to 40% impairment to improve QOL           Electronically signed by: Nadia Phelps PT, DPT, 9/13/2022, 5:14 PM        If you have any questions or concerns, please don't hesitate to call.   Thank you for your referral.      Physician Signature:________________________________Date:_________ TIME: _____  By signing above, therapists plan is approved by physician

## 2022-09-13 NOTE — FLOWSHEET NOTE
Outpatient Physical Therapy  [place]           [x] Phone: [phone]   Fax: [phone]  [facility]           [] Phone: [phone]   Fax: [phone]        Physical Therapy Daily Treatment Note  Date:  2022    Patient Name:  Inga Doe    :  1970  MRN: [id number]  Restrictions/Precautions: No data recorded      Diagnosis:     Radiculopathy, lumbar region [M54.16] Diagnosis: Low Back Pain  Date of Injury/Surgery: 2009  Treatment Diagnosis:     Insurance/Certification information: Jennifer Ruffinmartin  Referring Physician:  NEGRA Aleman NP, APRN - NP   PCP: Juanita Snyder PA-C  Next Doctor Visit:    Plan of care signed (Y/N):    Outcome Measure: Oswestry 60% impaired  Visit# / total visits:   Pain level: 710   Goals:     Patient goals: 6 treatments  Short term goals  Time Frame for Short term goals: The patient will increase left hip strength to > or = to 4/5     Patient will be able to walk > 1000 ft during 6 minute walk test  Patient will be able to correctly use straight cane during ambulation for long distance walks  Patint will be independent in HEP     Long Term Goals  Time Frame for Long Term Goals: 12 treatments  Patient will be able to walk > 1500 feet during 6 minute walk test  Patient will increase left LE strength to > or = to 4+/5  Patient will be independent in the management of her LBP / radicular symptoms  Patient will decrease Oswestry low back pain score to < or = to 40% impairment to improve QOL         Summary of Evaluation:   The patient reports low back pain with radicular symptoms since . Since onset the patient has completed 2 bouts of physical therapy with mild benefit and epidural / cortisone injections with mild benefit. The patient reports increased symptoms over the past couple months leading her to physical therapy.  The patient's symptoms are described as shap, ache and burning sensation in the left side of the lumbar spine and down the left LE extending past the knee. Standing and walking increase symptoms while sitting with legs crossed and bending forward improve symptoms. The patient would like to avoid surgery, return to gardening and improve endurance with therapy services. Subjective:  See michael         Any changes in Ambulatory Summary Sheet? None        Objective:  See michael   COVID screening questions were asked and patient attested that there had been no contact or symptoms        Exercises: (No more than 4 columns)   Exercise/Equipment Date Date Date           WARM UP                     TABLE                                       STANDING                                                     PROPRIOCEPTION                                    MODALITIES                      Other Therapeutic Activities/Education:        Home Exercise Program:        Manual Treatments:        Modalities:        Communication with other providers:        Assessment:  (Response towards treatment session) (Pain Rating)   The patient reports low back pain with radicular symptoms since 2009. Since onset the patient has completed 2 bouts of physical therapy with mild benefit and epidural / cortisone injections with mild benefit. The patient reports increased symptoms over the past couple months leading her to physical therapy. The patient's symptoms are described as shap, ache and burning sensation in the left side of the lumbar spine and down the left LE extending past the knee. Standing and walking increase symptoms while sitting with legs crossed and bending forward improve symptoms. The patient would like to avoid surgery, return to gardening and improve endurance with therapy services.        Plan for Next Session:   Specific Instructions for Next Treatment: Nu- step, lumbar flexion, trial left facet gapping, flexion based core stabilization, LE mat exercises, gait    Time In / Time Out:        Time In  Time Out    [id number]       Timed

## 2022-09-13 NOTE — PROGRESS NOTES
Physical Therapy: Initial Evaluation    Patient: Maryanne Finch (33 y.o. female)   Examination Date:   Plan of Care Certification Period: 2022 to        :  1970 ;    Confirmed: Yes MRN: [id number]  CSN: [id number]   Insurance: Payor: Sharif Minor / Plan: Camericki Barrios / Product Type: *No Product type* /   Insurance ID: [id number] - (Medicaid Managed) Secondary Insurance (if applicable):    Referring Physician: NEGRA Stoery NP, APRN - NP   PCP: Juan Antonio Steele PA-C Visits to Date/Visits Approved:   /  (Pre cert)    No Show/Cancelled Appts:   /       Medical Diagnosis: Radiculopathy, lumbar region [M54.16] Low Back Pain  Treatment Diagnosis:       PERTINENT MEDICAL HISTORY   Patient Assessed for Rehabilitation Services: Yes       Medical History: Chart Reviewed: Yes   Past Medical History:   Diagnosis Date    Abnormal Pap smear of cervix     mild dysplasia, cryo      Arthritis     back    COPD (chronic obstructive pulmonary disease) ([facility] [address]     Fibromyalgia     Graves disease     Hypertension      Surgical History:   Past Surgical History:   Procedure Laterality Date    COLONOSCOPY      GYNECOLOGIC CRYOSURGERY      THYROIDECTOMY      TUBAL LIGATION         Medications:   Current Outpatient Medications:     estradiol (ESTRACE) 1 MG tablet, Take 1 tablet by mouth daily, Disp: 90 tablet, Rfl: 3    progesterone (PROMETRIUM) 200 MG CAPS capsule, TAKE 1 CAPSULE BY MOUTH ONCE DAILY, Disp: 90 capsule, Rfl: 3    FLOVENT  MCG/ACT inhaler, Inhale 2 puffs by mouth twice daily, Disp: 12 g, Rfl: 5    losartan (COZAAR) 50 MG tablet, Take 50 mg by mouth daily, Disp: , Rfl:     levothyroxine (SYNTHROID) 125 MCG tablet, Take 125 mcg by mouth Daily, Disp: , Rfl:     DULoxetine (CYMBALTA) 30 MG extended release capsule, Take 30 mg by mouth daily, Disp: , Rfl:     albuterol sulfate HFA (VENTOLIN HFA) 108 (90 Base) MCG/ACT inhaler, Inhale 2 puffs into the lungs every 6 hours, Disp: 1 Inhaler, Rfl: 5    fluticasone (FLOVENT HFA) 110 MCG/ACT inhaler, Inhale 2 puffs into the lungs 2 times daily, Disp: 1 Inhaler, Rfl: 5    amitriptyline (ELAVIL) 25 MG tablet, , Disp: , Rfl:     cyclobenzaprine (FLEXERIL) 5 MG tablet, Take 5 mg by mouth 3 times daily as needed for Muscle spasms, Disp: , Rfl:     norethindrone (JUAN MIGUEL) 0.35 MG tablet, Take 1 tablet by mouth daily , Disp: , Rfl:     ibuprofen (ADVIL;MOTRIN) 800 MG tablet, Take 800 mg by mouth every 6 hours as needed for Pain, Disp: , Rfl:     albuterol sulfate HFA (VENTOLIN HFA) 108 (90 Base) MCG/ACT inhaler, Inhale 2 puffs into the lungs every 6 hours as needed for Wheezing , Disp: , Rfl:     methocarbamol (ROBAXIN) 750 MG tablet, Take 750 mg by mouth 4 times daily , Disp: , Rfl:   Allergies: Patient has no known allergies. SUBJECTIVE EXAMINATION     History obtained from[de-identified] Patient, Chart Review,      Family/Caregiver Present: No    Subjective History: The patient reports low back pain with radicular symptoms since 2009. Since onset the patient has completed 2 bouts of physical therapy with mild benefit and epidural / cortisone injections with mild benefit. The patient reports increased symptoms over the past couple months leading her to physical therapy. The patient's symptoms are described as shap, ache and burning sensation in the left side of the lumbar spine and down the left LE extending past the knee. Standing and walking increase symptoms while sitting with legs crossed and bending forward improve symptoms. The patient would like to avoid surgery, return to gardening and improve endurance with therapy services.        Additional Pertinent Hx (if applicable):     Prior diagnostic testing[de-identified] MRI, X-ray  Previous treatments prior to current episode?: Injections, Outpatient PT      Learning/Language: Learning  Does the patient/guardian have any barriers to learning?: No barriers  Will there be a co-learner?: No  What is the preferred language of the patient/guardian?: English  Is an  required?: No  How does the patient/guardian prefer to learn new concepts?: Listening, Reading, Demonstration, Pictures/Videos     Pain Screening   Pain Screening  Pain Level: 8  Best Pain Level: 7  Worst Pain Level: 9  Pain Location: Back  Pain Orientation: Left  Pain Descriptors: Aching, Sharp, Burning  Pain Onset: Awakened from sleep  Aggravating factors: Walking, Standing, Sitting  Pain Management/Relieving Factors: Laying supine    Functional Status         Social History:  Social History  Lives With: Spouse  Type of Home: House  Home Layout: One level  Bathroom Shower/Tub: Tub/Shower unit  Bathroom Equipment: Grab bars in shower  Home Equipment: Cane    Occupation/Interests:  Occupation: Other(comment) (Not employed)    Prior Level of Function:  Independent          Current Level of Function:  mod I: needes help with high level ADL's such as laundry      ADL Assistance: Independent  Homemaking Assistance: Independent  Ambulation Assistance: Independent  Transfer Assistance: Independent  Active : Yes  Mode of Transportation: Car    OBJECTIVE EXAMINATION     Review of Systems:  Follows Commands: Within Functional Limits    Observations:   Decreased lumbar lordosis     Ambulation/Gait (if applicable):  Ambulation  Quality of Gait: Left antalgic gait with decreased left hip extension    Balance Screen:  Sharpened Romberg/Tandem  Eyes Open: 30 seconds  Sway: Moderate  Strategy: Ankle    Neuro Screen:  Sensation      Sensation  Overall Sensation Status: Impaired  Light Touch: Partial deficits in the LLE (L5 distribution)     Left AROM  Right AROM                    Left PROM  Right PROM      General PROM LE: Right WFL, Left WFL    General PROM LE: Right WFL, Left WFL        Left Strength  Right Strength         Strength LLE  L Hip Flexion: 4-/5  L Hip Extension: 4-/5  L Hip ABduction: 4-/5  L Knee Flexion: 4-/5  L Knee Extension: 4+/5  L Ankle Dorsiflexion: 4+/5    Strength RLE  R Hip Flexion: 4/5  R Hip Extension: 4/5  R Hip ABduction: 4/5  R Knee Flexion: 4/5  R Knee Extension: 4+/5  R Ankle Dorsiflexion: 5/5     Lumbar Assessment   AROM Lumbar Spine   Flexion: Min (Pain)  Extension: Max  Lateral Flexion Right: Min  Lateral Flexion Left: Mod  Right Rotation: Min  Left Rotation: Mod         Joint Mobility (if applicable):   Joint Integrity Hip  General Joint Integrity - Hip: Right WNL, Left WNL    Special Tests:   Special Tests Lumbar Spine  SLR: L (+)    ASSESSMENT     Impression:  Patient would benefit from skilled physical therapy services in order to: The patient is a 48 y/o female that presents with LBP. The patient presents with impairments of increased pain, decreased ROM, strength, core stability, gait dysfunction and ambulatory tolerance. The patient signs and symptoms may be consistent with lumbar stenosis. The patient will benefit from therapy services to address the above impairments to improve current level of function. Body Structures, Functions, Activity Limitations Requiring Skilled Therapeutic Intervention: Decreased functional mobility , Decreased ADL status, Decreased ROM, Decreased strength, Decreased endurance, Decreased balance, Increased pain    Statement of Medical Necessity: Physical Therapy is both indicated and medically necessary as outlined in the POC to increase the likelihood of meeting the functionally related goals stated below. Patient agrees with established plan of care and assisted in the development of their short term and long term goals. Patient had no adverse reaction with initial treatment and there are no barriers to learning. Learning preferences include demonstration, practice, and handouts. Patient expressed understanding of HEP and appears to be motivated to participate in an active PT program including compliance with HEP expectations.        Patient's Activity Tolerance: Patient tolerated evaluation without incident      Patient's rehabilitation potential/prognosis is considered to be: Good    Factors which may impact rehabilitation potential include:          GOALS   Patient Goal(s): 6 treatments  Short Term Goals Completed by The patient will increase left hip strength to > or = to 4/5 Goal Status         Patient will be able to walk > 1000 ft during 6 minute walk test     Patient will be able to correctly use straight cane during ambulation for long distance walks     Patint will be independent in HEP                                             Long Term Goals Completed by 12 treatments Goal Status   Patient will be able to walk > 1500 feet during 6 minute walk test     Patient will increase left LE strength to > or = to 4+/5     Patient will be independent in the management of her LBP / radicular symptoms     Patient will decrease Oswestry low back pain score to < or = to 40% impairment to improve QOL                                              TREATMENT PLAN       Requires PT Follow-Up: Yes  Specific Instructions for Next Treatment: Nu- step, lumbar flexion, trial left facet gapping, flexion based core stabilization, LE mat exercises, gait    Pt. actively involved in establishing Plan of Care and Goals: Yes  Patient/ Caregiver education and instruction: Goals, PT Role, Plan of Care, Evaluative findings, Insurance, Home Exercise Program, Energy Conservation, General Safety, Gait Training, Fall prevention strategies             Treatment may include any combination of the following: Strengthening, ROM, Balance training, Functional mobility training, Transfer training, IADL training, Endurance training, Gait training, Stair training, Neuromuscular re-education, Manual Therapy - Soft Tissue Mobilization, Manual Therapy - Joint Manipulation, Pain management, Home exercise program, Safety education & training, Positioning, Integrated dry needling, Therapeutic activities     Frequency / Duration: Patient to be seen 2 x per week for 6 weeks weeks      Eval Complexity: Overall Evaluation : Low       Therapy Time  Individual Time In:    9018     Individual Time Out:  3576  Minutes:  40        Therapist Signature: Ellen Aguero, CRYSTAL    Date: 1/93/1168     I certify that the above Therapy Services are being furnished while the patient is under my care. I agree with the treatment plan and certify that this therapy is necessary. [de-identified] Signature:  ___________________________   Date:_______                                                                   NEGRA Ferreira NP        Physician Comments: _______________________________________________    Please sign and return to   [facility]. Please fax to the location listed below.  William Whitten for this referral!    [address] [facility] [address], [address] [id number]  Dept: [phone]  Loc: [phone]       POC NOTE

## 2022-09-16 ENCOUNTER — HOSPITAL ENCOUNTER (OUTPATIENT)
Dept: PHYSICAL THERAPY | Age: 52
Setting detail: THERAPIES SERIES
Discharge: HOME | End: 2022-09-16
Payer: COMMERCIAL

## 2022-09-16 PROCEDURE — 97110 THERAPEUTIC EXERCISES: CPT

## 2022-09-16 PROCEDURE — 97112 NEUROMUSCULAR REEDUCATION: CPT

## 2022-09-16 PROCEDURE — 97140 MANUAL THERAPY 1/> REGIONS: CPT

## 2022-09-16 NOTE — FLOWSHEET NOTE
Outpatient Physical Therapy  [place]           [x] Phone: [phone]   Fax: [phone]  Karlo Goff           [] Phone: [phone]   Fax: [phone]        Physical Therapy Daily Treatment Note  Date:  2022    Patient Name:  Jenn Villatoro    :  1970  MRN: [id number]  Restrictions/Precautions: No data recorded      Diagnosis:     Radiculopathy, lumbar region [M54.16] Diagnosis: Low Back Pain  Date of Injury/Surgery: 2009  Treatment Diagnosis:   APPROVED 90 UNITS PER CPT BY 2022 EXCEPT DRY NEEDLING PENDING. [facility]#[id number]  Insurance/Certification information: [facility]       Referring Physician:  NEGRA Badillo NP, APRN - NP   PCP: Jaun Joyce PA-C  Next Doctor Visit:    Plan of care signed (Y/N):    Outcome Measure: Oswestry 60% impaired  Visit# / total visits:   Pain level: 7/10   Goals:     Patient goals: 6 treatments  Short term goals  Time Frame for Short term goals: The patient will increase left hip strength to > or = to 4/5     Patient will be able to walk > 1000 ft during 6 minute walk test  Patient will be able to correctly use straight cane during ambulation for long distance walks  Patint will be independent in HEP     Long Term Goals  Time Frame for Long Term Goals: 12 treatments  Patient will be able to walk > 1500 feet during 6 minute walk test  Patient will increase left LE strength to > or = to 4+/5  Patient will be independent in the management of her LBP / radicular symptoms  Patient will decrease Oswestry low back pain score to < or = to 40% impairment to improve QOL         Summary of Evaluation:   The patient reports low back pain with radicular symptoms since . Since onset the patient has completed 2 bouts of physical therapy with mild benefit and epidural / cortisone injections with mild benefit. The patient reports increased symptoms over the past couple months leading her to physical therapy.  The patient's symptoms are described as shap, ache and burning sensation in the left side of the lumbar spine and down the left LE extending past the knee. Standing and walking increase symptoms while sitting with legs crossed and bending forward improve symptoms. The patient would like to avoid surgery, return to gardening and improve endurance with therapy services. Subjective:  Pt stated that  her pain was 7/10 with radiating pain to L foot. Pt stated that she woke up in a more \"twisted\" position and noticed that she had pain further down her L LE today. Any changes in Ambulatory Summary Sheet? None        Objective:    COVID screening questions were asked and patient attested that there had been no contact or symptoms  High on L ASIS  Poor abdominal control with marches  Initially decreased tolerance to L hip rotation, but it improved after manual.  Exercises: (No more than 4 columns)   Exercise/Equipment 9/16/2022 #2 Date Date           WARM UP         Nu-step  L-3 x 5'            TABLE      TA 10x2 5\"      PPT 10x2 5\"      PPT w/ mini march 10x2 5\"  ea LE     Seated nerve glide ( LAQ w/ AP 15x     Bent knee fallout w/ TA PROM on L   10x2 on R     Prone quad stretch Man 30\" x4     STANDING      Heel raises 10x2                                              PROPRIOCEPTION                                    MODALITIES                      Other Therapeutic Activities/Education:  Reviewed all exercises on HEP      Home Exercise Program:  Access Code: [email or web address]  URL: [employer] com/  Date: 09/16/2022  Prepared by: Candie Patterson PTA    Exercises  Supine Hip Adduction Isometric with Ball - 1-2 x daily - 7 x weekly - 2 sets - 10 reps - 5 sec hold  Supine Transversus Abdominis Bracing - Hands on Stomach - 1-2 x daily - 7 x weekly - 2 sets - 10 reps - 5 sec hold  Supine Posterior Pelvic Tilt - 1-2 x daily - 7 x weekly - 2 sets - 10 reps - 5 sec hold  Supine March with Posterior Pelvic Tilt - 1-2 x daily - 7 x weekly - 2 sets - 10 reps - 5 sec hold  Bent Knee Fallouts - 1-2 x daily - 7 x weekly - 2 sets - 10 reps - 2-3 sec hold        Manual Treatments:  PROM L hip in all directions to tolerance, TPR to L gluteals , gentle leg pull on R       Modalities:        Communication with other providers:        Assessment:  (Response towards treatment session) (Pain Rating) Pt tolerated treatment fair. Pt  responded well to manual correction and treatment. Pt reported pain at 6/10 L thigh and reported more mobility in low back/ spine with no radicular symptoms past L thigh. Pt will continue to benefit from  more strengthening and ROM. The patient reports low back pain with radicular symptoms since 2009. Since onset the patient has completed 2 bouts of physical therapy with mild benefit and epidural / cortisone injections with mild benefit. The patient reports increased symptoms over the past couple months leading her to physical therapy. The patient's symptoms are described as shap, ache and burning sensation in the left side of the lumbar spine and down the left LE extending past the knee. Standing and walking increase symptoms while sitting with legs crossed and bending forward improve symptoms. The patient would like to avoid surgery, return to gardening and improve endurance with therapy services.        Plan for Next Session:        Time In / Time Out:    1045/1148    If [employer] Please Indicate Units for Rx this date and running total for each and overall total for Rx to date:  CPT Code Units today Running Total Units Total approved    TE 03678  1 1    MAN 47512  2 2    Gait 91736      NR 16489 1 1    TA  15535      Estim Unatt [id number]       [address] [place] [id number]      [facility] [address]       ADL/Self care 87569      DNT 1-2 20560      DNT 3-4 20561      Other:     1           Total for episode of care   5 90           Timed Code/Total Treatment Minutes:  53'/63' 2 man ( 23') 1 neuro ( 15') 1 TE (15') 1 HP x 10' no charge        Next Progress Note due:  6th visit       Plan of Care Interventions:  [x] Therapeutic Exercise  [] Modalities:  [x] Therapeutic Activity     [] Ultrasound  [] Estim  [x] Gait Training      [] Cervical Traction [] Lumbar Traction  [x] Neuromuscular Re-education    [x] Cold/hotpack [] Iontophoresis   [x] Instruction in HEP      [] Vasopneumatic   [x] Dry Needling    [x] Manual Therapy               [] Aquatic Therapy              Electronically signed by:  Jamia Husain 9/16/2022, 9:32 AM      9/16/2022,4:17 PM

## 2022-09-23 ENCOUNTER — HOSPITAL ENCOUNTER (OUTPATIENT)
Dept: PHYSICAL THERAPY | Age: 52
Setting detail: THERAPIES SERIES
Discharge: HOME | End: 2022-09-23
Payer: COMMERCIAL

## 2022-09-23 PROCEDURE — 97140 MANUAL THERAPY 1/> REGIONS: CPT

## 2022-09-23 PROCEDURE — 97110 THERAPEUTIC EXERCISES: CPT

## 2022-09-23 NOTE — FLOWSHEET NOTE
Outpatient Physical Therapy  [place]           [x] Phone: [phone]   Fax: [phone]  Melissakailyn Agee           [] Phone: [phone]   Fax: [phone]        Physical Therapy Daily Treatment Note  Date:  2022    Patient Name:  Jennifer Anthony    :  1970  MRN: [id number]  Restrictions/Precautions: No data recorded      Diagnosis:     Radiculopathy, lumbar region [M54.16] Diagnosis: Low Back Pain  Date of Injury/Surgery: 2009  Treatment Diagnosis:   APPROVED 90 UNITS PER CPT BY 2022 EXCEPT DRY NEEDLING PENDING. [facility]#[id number]  Insurance/Certification information: [facility]       Referring Physician:  NEGRA Fonseca NP, APRN - NP   PCP: Delonte Dye PA-C  Next Doctor Visit:    Plan of care signed (Y/N):    Outcome Measure: Oswestry 60% impaired  Visit# / total visits: 3/ 12  Pain level: 10/10       Goals:     Patient goals: 6 treatments  Short term goals  Time Frame for Short term goals: The patient will increase left hip strength to > or = to 4/5     Patient will be able to walk > 1000 ft during 6 minute walk test  Patient will be able to correctly use straight cane during ambulation for long distance walks  Patint will be independent in HEP     Long Term Goals  Time Frame for Long Term Goals: 12 treatments  Patient will be able to walk > 1500 feet during 6 minute walk test  Patient will increase left LE strength to > or = to 4+/5  Patient will be independent in the management of her LBP / radicular symptoms  Patient will decrease Oswestry low back pain score to < or = to 40% impairment to improve QOL         Summary of Evaluation:   The patient reports low back pain with radicular symptoms since . Since onset the patient has completed 2 bouts of physical therapy with mild benefit and epidural / cortisone injections with mild benefit. The patient reports increased symptoms over the past couple months leading her to physical therapy.  The patient's symptoms are described as shap, ache and burning sensation in the left side of the lumbar spine and down the left LE extending past the knee. Standing and walking increase symptoms while sitting with legs crossed and bending forward improve symptoms. The patient would like to avoid surgery, return to gardening and improve endurance with therapy services. Subjective:  Bandar Romero arrives to therapy stating that the back pain is a 10/10 today, yesterday she pulled out the Halloween decorations and that increased her pain, feels like she over did it. Then didn't sleep well. Low back on the L side, down into the front and back of the leg down past the knee and into the foot. N/T as well, feels like her foot is falling asleep. Any changes in Ambulatory Summary Sheet? None        Objective:  COVID screening questions were asked and patient attested that there had been no contact or symptoms    Antalgic gait and guarded with movement transitions. Min tolerance to PROM of the hip with reported pinching sensation in low back with nearly all movements. Increased pinch and pain with light leg pull. .. these things were discontinued due to increased pain. Tender glut. Empty end feel with prone quad stretch. Exercises: (No more than 4 columns)   Exercise/Equipment 9/16/2022 #2 9/23/2022 #3 Date           WARM UP         Nu-step  L-3 x 5'  --          TABLE      TA 10x2 5\"  10*3\"    PPT 10x2 5\"  10*3\"     PPT w/ mini march 10x2 5\"  ea LE 10* ea     Seated nerve glide ( LAQ w/ AP 15x 2*10    Bent knee fallout w/ TA PROM on L   10x2 on R --    Prone quad stretch Man 30\" x4 4*30\" L          STANDING      Heel raises 10x2 --                                             PROPRIOCEPTION                                    MODALITIES                      Other Therapeutic Activities/Education:     Home Exercise Program:  Access Code: [email or web address]  URL: [email or web address]. com/  Date: 09/16/2022  Prepared by: Padmini Salazar PTA    Exercises  Supine Hip Adduction Isometric with Ball - 1-2 x daily - 7 x weekly - 2 sets - 10 reps - 5 sec hold  Supine Transversus Abdominis Bracing - Hands on Stomach - 1-2 x daily - 7 x weekly - 2 sets - 10 reps - 5 sec hold  Supine Posterior Pelvic Tilt - 1-2 x daily - 7 x weekly - 2 sets - 10 reps - 5 sec hold  Supine March with Posterior Pelvic Tilt - 1-2 x daily - 7 x weekly - 2 sets - 10 reps - 5 sec hold  Bent Knee Fallouts - 1-2 x daily - 7 x weekly - 2 sets - 10 reps - 2-3 sec hold        Manual Treatments:  PROM L hip in all directions to tolerance, TPR to L gluteals , gentle leg pull on R       Modalities:  none       Communication with other providers:  none       Assessment:  Lowell Ahumada was in more pain today and had increased radicular symptoms due to doing too much work yesterday so we did not progress anything today and focused on pain relief methods.  End session pain: same       Plan for Next Session:        Time In / Time Out:    1115/1149    If [employer] Please Indicate Units for Rx this date and running total for each and overall total for Rx to date:  CPT Code Units today Running Total Units Total approved    TE 16196  1 2    MAN 69184  1 3    Gait 98316      NR 54734  1    TA  50984      Estim Unatt 10663       [facility] [place] [address]      VASO [address]9587       ADL/Self care [address]1971      DNT 1-2 60205      DNT 3-4 29185      Other:     1           Total for episode of care   7 90           Timed Code/Total Treatment Minutes:  29' 1 man 10' 1 TE 24'        Next Progress Note due:  6th visit       Plan of Care Interventions:  [x] Therapeutic Exercise  [] Modalities:  [x] Therapeutic Activity     [] Ultrasound  [] Estim  [x] Gait Training      [] Cervical Traction [] Lumbar Traction  [x] Neuromuscular Re-education    [x] Cold/hotpack [] Iontophoresis   [x] Instruction in HEP      [] Vasopneumatic   [x] Dry Needling    [x] Manual Therapy               [] Aquatic Therapy              Electronically signed by:  Mague Brar          9/23/2022,6:48 AM

## 2022-09-27 ENCOUNTER — OFFICE VISIT (OUTPATIENT)
Dept: OBGYN | Age: 52
End: 2022-09-27
Payer: COMMERCIAL

## 2022-09-27 ENCOUNTER — HOSPITAL ENCOUNTER (OUTPATIENT)
Dept: PHYSICAL THERAPY | Age: 52
Setting detail: THERAPIES SERIES
Discharge: HOME | End: 2022-09-27
Payer: COMMERCIAL

## 2022-09-27 VITALS
HEART RATE: 69 BPM | WEIGHT: 143 LBS | BODY MASS INDEX: 21.18 KG/M2 | HEIGHT: 69 IN | SYSTOLIC BLOOD PRESSURE: 126 MMHG | DIASTOLIC BLOOD PRESSURE: 91 MMHG

## 2022-09-27 DIAGNOSIS — Z01.419 WOMEN'S ANNUAL ROUTINE GYNECOLOGICAL EXAMINATION: Primary | ICD-10-CM

## 2022-09-27 DIAGNOSIS — N95.1 MENOPAUSAL SYMPTOMS: ICD-10-CM

## 2022-09-27 PROCEDURE — 97140 MANUAL THERAPY 1/> REGIONS: CPT

## 2022-09-27 PROCEDURE — 97110 THERAPEUTIC EXERCISES: CPT

## 2022-09-27 PROCEDURE — 99396 PREV VISIT EST AGE 40-64: CPT | Performed by: OBSTETRICS & GYNECOLOGY

## 2022-09-27 RX ORDER — PROGESTERONE 200 MG/1
CAPSULE ORAL
Qty: 90 CAPSULE | Refills: 3 | Status: SHIPPED | OUTPATIENT
Start: 2022-09-27 | End: 2022-10-28

## 2022-09-27 RX ORDER — ESTRADIOL 1 MG/1
1 TABLET ORAL DAILY
Qty: 90 TABLET | Refills: 3 | Status: SHIPPED | OUTPATIENT
Start: 2022-09-27

## 2022-09-27 SDOH — ECONOMIC STABILITY: TRANSPORTATION INSECURITY
IN THE PAST 12 MONTHS, HAS THE LACK OF TRANSPORTATION KEPT YOU FROM MEDICAL APPOINTMENTS OR FROM GETTING MEDICATIONS?: NO

## 2022-09-27 SDOH — ECONOMIC STABILITY: HOUSING INSECURITY
IN THE LAST 12 MONTHS, WAS THERE A TIME WHEN YOU DID NOT HAVE A STEADY PLACE TO SLEEP OR SLEPT IN A SHELTER (INCLUDING NOW)?: NO

## 2022-09-27 SDOH — ECONOMIC STABILITY: FOOD INSECURITY: WITHIN THE PAST 12 MONTHS, THE FOOD YOU BOUGHT JUST DIDN'T LAST AND YOU DIDN'T HAVE MONEY TO GET MORE.: NEVER TRUE

## 2022-09-27 SDOH — ECONOMIC STABILITY: TRANSPORTATION INSECURITY
IN THE PAST 12 MONTHS, HAS LACK OF TRANSPORTATION KEPT YOU FROM MEETINGS, WORK, OR FROM GETTING THINGS NEEDED FOR DAILY LIVING?: NO

## 2022-09-27 SDOH — ECONOMIC STABILITY: FOOD INSECURITY: WITHIN THE PAST 12 MONTHS, YOU WORRIED THAT YOUR FOOD WOULD RUN OUT BEFORE YOU GOT MONEY TO BUY MORE.: NEVER TRUE

## 2022-09-27 SDOH — ECONOMIC STABILITY: INCOME INSECURITY: IN THE LAST 12 MONTHS, WAS THERE A TIME WHEN YOU WERE NOT ABLE TO PAY THE MORTGAGE OR RENT ON TIME?: NO

## 2022-09-27 ASSESSMENT — ENCOUNTER SYMPTOMS
EYES NEGATIVE: 1
GASTROINTESTINAL NEGATIVE: 1
RESPIRATORY NEGATIVE: 1
ALLERGIC/IMMUNOLOGIC NEGATIVE: 1

## 2022-09-27 ASSESSMENT — PATIENT HEALTH QUESTIONNAIRE - PHQ9
2. FEELING DOWN, DEPRESSED OR HOPELESS: 0
SUM OF ALL RESPONSES TO PHQ QUESTIONS 1-9: 0
2. FEELING DOWN, DEPRESSED OR HOPELESS: 0
SUM OF ALL RESPONSES TO PHQ QUESTIONS 1-9: 0
1. LITTLE INTEREST OR PLEASURE IN DOING THINGS: 0
SUM OF ALL RESPONSES TO PHQ9 QUESTIONS 1 & 2: 0
SUM OF ALL RESPONSES TO PHQ QUESTIONS 1-9: 0
SUM OF ALL RESPONSES TO PHQ QUESTIONS 1-9: 0
1. LITTLE INTEREST OR PLEASURE IN DOING THINGS: 0
SUM OF ALL RESPONSES TO PHQ QUESTIONS 1-9: 0
SUM OF ALL RESPONSES TO PHQ QUESTIONS 1-9: 0
SUM OF ALL RESPONSES TO PHQ9 QUESTIONS 1 & 2: 0

## 2022-09-27 ASSESSMENT — SOCIAL DETERMINANTS OF HEALTH (SDOH): HOW HARD IS IT FOR YOU TO PAY FOR THE VERY BASICS LIKE FOOD, HOUSING, MEDICAL CARE, AND HEATING?: NOT HARD AT ALL

## 2022-09-27 NOTE — FLOWSHEET NOTE
Outpatient Physical Therapy  [place]           [x] Phone: [phone]   Fax: [phone]  Geroge Mcburney           [] Phone: [phone]   Fax: [phone]        Physical Therapy Daily Treatment Note  Date:  2022    Patient Name:  Rolando Frank    :  1970  MRN: [id number]  Restrictions/Precautions: No data recorded      Diagnosis:     Radiculopathy, lumbar region [M54.16] Diagnosis: Low Back Pain  Date of Injury/Surgery: 2009  Treatment Diagnosis:   APPROVED 90 UNITS PER CPT BY 2022 EXCEPT DRY NEEDLING PENDING. [facility]#[id number]  Insurance/Certification information: [employer]       Referring Physician:  NEGRA Shipley NP, APRN - NP   PCP: Hans Saez PA-C  Next Doctor Visit:    Plan of care signed (Y/N):    Outcome Measure: Oswestry 60% impaired  Visit# / total visits:   Pain level: 10/10       Goals:     Patient goals: 6 treatments  Short term goals  Time Frame for Short term goals: The patient will increase left hip strength to > or = to 4/5     Patient will be able to walk > 1000 ft during 6 minute walk test  Patient will be able to correctly use straight cane during ambulation for long distance walks  Patint will be independent in HEP     Long Term Goals  Time Frame for Long Term Goals: 12 treatments  Patient will be able to walk > 1500 feet during 6 minute walk test  Patient will increase left LE strength to > or = to 4+/5  Patient will be independent in the management of her LBP / radicular symptoms  Patient will decrease Oswestry low back pain score to < or = to 40% impairment to improve QOL         Summary of Evaluation:   The patient reports low back pain with radicular symptoms since . Since onset the patient has completed 2 bouts of physical therapy with mild benefit and epidural / cortisone injections with mild benefit. The patient reports increased symptoms over the past couple months leading her to physical therapy.  The patient's symptoms sets - 10 reps - 5 sec hold  Supine Transversus Abdominis Bracing - Hands on Stomach - 1-2 x daily - 7 x weekly - 2 sets - 10 reps - 5 sec hold  Supine Posterior Pelvic Tilt - 1-2 x daily - 7 x weekly - 2 sets - 10 reps - 5 sec hold  Supine March with Posterior Pelvic Tilt - 1-2 x daily - 7 x weekly - 2 sets - 10 reps - 5 sec hold  Bent Knee Fallouts - 1-2 x daily - 7 x weekly - 2 sets - 10 reps - 2-3 sec hold        Manual Treatments:  PROM L hip in all directions to tolerance, TPR to L gluteals , gentle leg pull on R       Modalities:  none       Communication with other providers:  none       Assessment: Patient arrives with 10/10 pain therefore focused on pain relief treatment. Started with heat followed by STM and MFR of the lumbar paraspinals to decrease muscle tone. Completed left sided facet gapping with noted relief of symptoms. Continued repeated flexion and light flexion based core stabilization. The patient reports reduction in pain symptoms post treatment. Added facet gapping and updated HEP as listed above.        Plan for Next Session:        Time In / Time Out:    1300/1345    If [employer] Please Indicate Units for Rx this date and running total for each and overall total for Rx to date:  CPT Code Units today Running Total Units Total approved    TE 35669  1 3    MAN 86501  2 5    Gait 35316      NR 42010  1    TA  08819      Estim Unatt 65575       [facility] [id number]      [facility] [address]       ADL/Self care 44475      DNT 1-2 45433      DNT 3-4 09985      Other:     1           Total for episode of care   10 90           Timed Code/Total Treatment Minutes: 39' Max x 2 TE x 1         Next Progress Note due:  6th visit       Plan of Care Interventions:  [x] Therapeutic Exercise  [] Modalities:  [x] Therapeutic Activity     [] Ultrasound  [] Estim  [x] Gait Training      [] Cervical Traction [] Lumbar Traction  [x] Neuromuscular Re-education    [x] Cold/hotpack [] Iontophoresis   [x] Instruction in

## 2022-09-27 NOTE — PROGRESS NOTES
22    [facility]  1970    Chief Complaint   Patient presents with    Gynecologic Exam     Annual exam, menopausal, on estadiol, sexually active, pap  neg, [facility] dexa never had, colonoscopy never had has appt with gastro in 2wks , no c/o . ns        The patient is a 47 y.o. female, No obstetric history on file. who presents for her annual exam.  She is menopausal.  She is taking HRT, estradiol 1mg. .  She is  sexually active. She reports no gynecological symptoms. Pap smear history: Her last PAP smear was in . Her results were normal.    Breast history: her most recent mammogram was in . The results were: Normal    Osteoporosis Status: she has not had a bone density scan    Colonoscopy Status: She has not had a colonoscopy in the past.    Past Medical History:   Diagnosis Date    Abnormal Pap smear of cervix     mild dysplasia, cryo      Arthritis     back    COPD (chronic obstructive pulmonary disease) ([facility] [address]     Fibromyalgia     Graves disease     Hypertension        Past Surgical History:   Procedure Laterality Date    COLONOSCOPY      GYNECOLOGIC CRYOSURGERY      THYROIDECTOMY      TUBAL LIGATION         No family history on file.     Social History     Tobacco Use    Smoking status: Every Day     Packs/day: 1.00     Types: Cigarettes     Last attempt to quit: 2018     Years since quittin.4    Smokeless tobacco: Never   Substance Use Topics    Alcohol use: Yes     Comment: occas       Current Outpatient Medications   Medication Sig Dispense Refill    estradiol (ESTRACE) 1 MG tablet Take 1 tablet by mouth daily 90 tablet 3    progesterone (PROMETRIUM) 200 MG CAPS capsule TAKE 1 CAPSULE BY MOUTH ONCE DAILY 90 capsule 3    fluticasone (FLOVENT HFA) 110 MCG/ACT inhaler Inhale 2 puffs into the lungs 2 times daily 1 each 5    albuterol sulfate HFA (VENTOLIN HFA) 108 (90 Base) MCG/ACT inhaler Inhale 2 puffs into the lungs in the morning and 2 puffs at noon and 2 puffs in the evening and 2 puffs before bedtime. 18 g 5    FLOVENT  MCG/ACT inhaler Inhale 2 puffs by mouth twice daily 12 g 5    losartan (COZAAR) 50 MG tablet Take 50 mg by mouth daily      levothyroxine (SYNTHROID) 125 MCG tablet Take 125 mcg by mouth Daily      DULoxetine (CYMBALTA) 30 MG extended release capsule Take 30 mg by mouth daily      fluticasone (FLOVENT HFA) 110 MCG/ACT inhaler Inhale 2 puffs into the lungs 2 times daily 1 Inhaler 5    amitriptyline (ELAVIL) 25 MG tablet       cyclobenzaprine (FLEXERIL) 5 MG tablet Take 5 mg by mouth 3 times daily as needed for Muscle spasms      ibuprofen (ADVIL;MOTRIN) 800 MG tablet Take 800 mg by mouth every 6 hours as needed for Pain      albuterol sulfate HFA (VENTOLIN HFA) 108 (90 Base) MCG/ACT inhaler Inhale 2 puffs into the lungs every 6 hours as needed for Wheezing       methocarbamol (ROBAXIN) 750 MG tablet Take 750 mg by mouth 4 times daily       norethindrone (MICRONOR) 0.35 MG tablet Take 1 tablet by mouth daily        No current facility-administered medications for this visit. No Known Allergies    No obstetric history on file. There is no immunization history on file for this patient. Review of Systems   Constitutional: Negative. Eyes: Negative. Respiratory: Negative. Cardiovascular: Negative. Gastrointestinal: Negative. Endocrine: Negative. Genitourinary: Negative. Musculoskeletal: Negative. Skin: Negative. Allergic/Immunologic: Negative. Neurological: Negative. Hematological: Negative. Psychiatric/Behavioral: Negative. BP (!) 126/91   Pulse 69   Ht 5' 9\" (1.753 m)   Wt 143 lb (64.9 kg)   BMI 21.12 kg/m²     Physical Exam  Exam conducted with a chaperone present. Constitutional:       Appearance: Normal appearance. HENT:      Head: Normocephalic and atraumatic.       Nose: Nose normal.   Eyes:      Conjunctiva/sclera: Conjunctivae normal.   Cardiovascular:      Rate 9/27/2023).     Michelle Baez MD

## 2022-09-30 ENCOUNTER — OFFICE VISIT (OUTPATIENT)
Dept: GASTROENTEROLOGY | Age: 52
End: 2022-09-30
Payer: COMMERCIAL

## 2022-09-30 ENCOUNTER — HOSPITAL ENCOUNTER (OUTPATIENT)
Dept: PHYSICAL THERAPY | Age: 52
Setting detail: THERAPIES SERIES
Discharge: HOME | End: 2022-09-30
Payer: COMMERCIAL

## 2022-09-30 VITALS
OXYGEN SATURATION: 99 % | HEIGHT: 69 IN | SYSTOLIC BLOOD PRESSURE: 124 MMHG | WEIGHT: 144 LBS | HEART RATE: 83 BPM | TEMPERATURE: 97.1 F | DIASTOLIC BLOOD PRESSURE: 86 MMHG | BODY MASS INDEX: 21.33 KG/M2

## 2022-09-30 DIAGNOSIS — Z86.010 HISTORY OF COLON POLYPS: ICD-10-CM

## 2022-09-30 DIAGNOSIS — R63.4 WEIGHT LOSS, UNINTENTIONAL: Primary | ICD-10-CM

## 2022-09-30 PROCEDURE — 97140 MANUAL THERAPY 1/> REGIONS: CPT

## 2022-09-30 PROCEDURE — G8420 CALC BMI NORM PARAMETERS: HCPCS | Performed by: SPECIALIST

## 2022-09-30 PROCEDURE — 99204 OFFICE O/P NEW MOD 45 MIN: CPT | Performed by: SPECIALIST

## 2022-09-30 PROCEDURE — 97110 THERAPEUTIC EXERCISES: CPT

## 2022-09-30 PROCEDURE — G8427 DOCREV CUR MEDS BY ELIG CLIN: HCPCS | Performed by: SPECIALIST

## 2022-09-30 PROCEDURE — 3017F COLORECTAL CA SCREEN DOC REV: CPT | Performed by: SPECIALIST

## 2022-09-30 PROCEDURE — 4004F PT TOBACCO SCREEN RCVD TLK: CPT | Performed by: SPECIALIST

## 2022-09-30 RX ORDER — TOPIRAMATE 25 MG/1
TABLET ORAL
COMMUNITY
Start: 2022-09-09

## 2022-09-30 RX ORDER — LEVOTHYROXINE SODIUM 112 UG/1
TABLET ORAL
COMMUNITY
Start: 2022-08-11

## 2022-09-30 NOTE — PROGRESS NOTES
Gastroenterology  Note  Adeola Dia. Champ CONNOR      Subjective:      Patient ID:      Hai Almodovar                 1970    CC: unexplained weight loss    HPI:     The patient is known to me from 2004 when she was anemic- had colonoscopy \"with removal of polyps\"- no follow up since. Has lost 20 pounds over the past year. She did stop drinking soda pop after Covid 3-4 mo ago. Denies anorexia, nausea, vomiting. Gets occasional heartburn about once per month. No dysphagia. no abd pain, diarrhea, constipation, melena, hematochezia. Takes 1-2 ibuprofen daily. Has history of Graves Disease-- thyroid level was high and is being adjusted    ROS: see HPI for positives and pertinent negatives. All other systems reviewed and are negative    Objective:     PHYSICAL EXAM:    Vitals:  /86 (Site: Left Upper Arm, Position: Sitting, Cuff Size: Medium Adult)   Pulse 83   Temp 97.1 °F (36.2 °C) (Infrared)   Ht 5' 9\" (1.753 m)   Wt 144 lb (65.3 kg)   SpO2 99%   BMI 21.27 kg/m²   CONSTITUTIONAL: alert, cooperative, no apparent distress,   EYES:  pupils equal, round and reactive to light and sclera clear  ENT:  normocepalic, without obvious abnormality  NECK:  supple, symmetrical, trachea midline  HEMATOLOGIC/LYMPHATICS:  no cervical lymphadenopathy and no supraclavicular lymphadenopathy  LUNGS:  clear to auscultation  CARDIOVASCULAR:  regular rate and rhythm and no murmur noted  ABDOMEN:  normal bowel sounds, soft, non-distended, non-tender with no masses or hepatomegaly palpated  NEUROLOGIC: no focal deficit detected  SKIN:  no lesions  EXTREMITIES: no clubbing, cyanosis, or edema     Assessment:     1) weight loss- ? Due to dietary change  2) history of colon polyps- not documented but due for screening colonoscopy anyway. Plan:     1) CBC, CMP CRP ESR  2) CT abd/pelvis   3) colonoscopy after the above- she will call after the above to schedule        Reynaldo Oakes M.D.

## 2022-09-30 NOTE — FLOWSHEET NOTE
Outpatient Physical Therapy  [place]           [x] Phone: [phone]   Fax: [phone]  [facility] Cerv[facility]           [] Phone: [phone]   Fax: [phone]        Physical Therapy Daily Treatment Note  Date:  2022    Patient Name:  Saroj Barber    :  1970  MRN: [id number]  Restrictions/Precautions: No data recorded      Diagnosis:     Radiculopathy, lumbar region [M54.16] Diagnosis: Low Back Pain  Date of Injury/Surgery: 2009  Treatment Diagnosis:   APPROVED 90 UNITS PER CPT BY 2022 EXCEPT DRY NEEDLING PENDING. [facility]#[id number]  Insurance/Certification information: Arlin Garcia       Referring Physician:  NEGRA Lee NP, APRN - NP   PCP: Dorcas Ricketts PA-C  Next Doctor Visit:    Plan of care signed (Y/N):    Outcome Measure: Oswestry 60% impaired  Visit# / total visits:   Pain level: 8-9/10       Goals:     Patient goals: 6 treatments  Short term goals  Time Frame for Short term goals: The patient will increase left hip strength to > or = to 4/5     Patient will be able to walk > 1000 ft during 6 minute walk test  Patient will be able to correctly use straight cane during ambulation for long distance walks  Patint will be independent in HEP     Long Term Goals  Time Frame for Long Term Goals: 12 treatments  Patient will be able to walk > 1500 feet during 6 minute walk test  Patient will increase left LE strength to > or = to 4+/5  Patient will be independent in the management of her LBP / radicular symptoms  Patient will decrease Oswestry low back pain score to < or = to 40% impairment to improve QOL         Summary of Evaluation:   The patient reports low back pain with radicular symptoms since . Since onset the patient has completed 2 bouts of physical therapy with mild benefit and epidural / cortisone injections with mild benefit. The patient reports increased symptoms over the past couple months leading her to physical therapy.  The patient's symptoms are described as shap, ache and burning sensation in the left side of the lumbar spine and down the left LE extending past the knee. Standing and walking increase symptoms while sitting with legs crossed and bending forward improve symptoms. The patient would like to avoid surgery, return to gardening and improve endurance with therapy services. Subjective:   Coby Ragland arrives to therapy stating that the back is about the same overall, pretty painful today. Does get some relief from the facet gapping exercise, for a few hours, but then comes right back. Any changes in Ambulatory Summary Sheet? None        Objective:  COVID screening questions were asked and patient attested that there had been no contact or symptoms    Very tender to palpation in glut. Only able to achieve 90° PROM into hip flexion secondary to pain and muscle guarding. Exercises: (No more than 4 columns)   Exercise/Equipment 9/23/2022 #3 9/27/2022 #4 9/30/2022 #5           WARM UP      Nu-step  -- L 3 5' L3 5'         TABLE      TA 10*3\"     PPT 10*3\"      PPT w/ mini march 10* ea      Seated nerve glide ( LAQ w/ AP 2*10     Bent knee fallout w/ TA --     Prone quad stretch 4*30\" L     Left lumbar facet gapping   2.5'  6'    Seated lumbar roll out   Forward 10x  Right 10x Fwd/R 10* ea         STANDING      Heel raises --                                              PROPRIOCEPTION                                    MODALITIES                      Other Therapeutic Activities/Education:     Home Exercise Program:  Access Code: [email or web address]  URL: [email or web address]  Date: 09/16/2022  Prepared by: Devendra Padron PTA    Exercises  Supine Hip Adduction Isometric with Ball - 1-2 x daily - 7 x weekly - 2 sets - 10 reps - 5 sec hold  Supine Transversus Abdominis Bracing - Hands on Stomach - 1-2 x daily - 7 x weekly - 2 sets - 10 reps - 5 sec hold  Supine Posterior Pelvic Tilt - 1-2 x daily - 7 x weekly - 2 sets - 10 reps - 5 sec hold  Supine March with Posterior Pelvic Tilt - 1-2 x daily - 7 x weekly - 2 sets - 10 reps - 5 sec hold  Bent Knee Fallouts - 1-2 x daily - 7 x weekly - 2 sets - 10 reps - 2-3 sec hold        Manual Treatments:  PROM L hip in all directions to tolerance, TPR to L gluteals , gentle leg pull on R       Modalities:  none       Communication with other providers:  none       Assessment:  Lyubov Hackett continues to be very painful overall with little relief from treatments in PT.  End session pain: same       Plan for Next Session:        Time In / Time Out:    0831/4600    If C[employer] Please Indicate Units for Rx this date and running total for each and overall total for Rx to date:  CPT Code Units today Running Total Units Total approved    TE 01245  1 196-198 [facility]  1 6    Gait 92843      NR 50569  1    TA  96926      Estim Unatt 35627       [place] [id number]      VASO 86437       ADL/Self care 34879      DNT 1-2 43179      DNT 3-4 38317      Other:     1           Total for episode of care   12 90           Timed Code/Total Treatment Minutes:  27' 1 man 10' 1 TE 20'        Next Progress Note due:  6th visit       Plan of Care Interventions:  [x] Therapeutic Exercise  [] Modalities:  [x] Therapeutic Activity     [] Ultrasound  [] Estim  [x] Gait Training      [] Cervical Traction [] Lumbar Traction  [x] Neuromuscular Re-education    [x] Cold/hotpack [] Iontophoresis   [x] Instruction in HEP      [] Vasopneumatic   [x] Dry Needling    [x] Manual Therapy               [] Aquatic Therapy              Electronically signed by:  Era Hewitt PTA             9/30/2022,6:48 AM

## 2022-10-07 ENCOUNTER — HOSPITAL ENCOUNTER (OUTPATIENT)
Dept: PHYSICAL THERAPY | Age: 52
Setting detail: THERAPIES SERIES
Discharge: HOME | End: 2022-10-07
Payer: COMMERCIAL

## 2022-10-07 PROCEDURE — 97110 THERAPEUTIC EXERCISES: CPT

## 2022-10-07 NOTE — FLOWSHEET NOTE
Outpatient Physical Therapy  [place]           [x] Phone: [phone]   Fax: [phone]  [facility]           [] Phone: [phone]   Fax: [phone]        Physical Therapy Daily Treatment Note  Date:  10/7/2022    Patient Name:  Ag Sawant    :  1970  MRN: [id number]  Restrictions/Precautions: No data recorded      Diagnosis:     Radiculopathy, lumbar region [M54.16] Diagnosis: Low Back Pain  Date of Injury/Surgery: 2009  Treatment Diagnosis:   APPROVED 90 UNITS PER CPT BY 2022 EXCEPT DRY NEEDLING PENDING. [facility]#[id number]  Ins[facility]/Certification information: [facility]       Referring Physician:  NEGRA Tinsley NP, APRN - NP   PCP: Isaac Brown PA-C  Next Doctor Visit:    Plan of care signed (Y/N):    Outcome Measure: Oswestry 60% impaired  Visit# / total visits:   Pain level: 9/10       Goals:     Patient goals: 6 treatments  Short term goals  Time Frame for Short term goals: The patient will increase left hip strength to > or = to 4/5     Patient will be able to walk > 1000 ft during 6 minute walk test  Patient will be able to correctly use straight cane during ambulation for long distance walks  Patint will be independent in HEP     Long Term Goals  Time Frame for Long Term Goals: 12 treatments  Patient will be able to walk > 1500 feet during 6 minute walk test  Patient will increase left LE strength to > or = to 4+/5  Patient will be independent in the management of her LBP / radicular symptoms  Patient will decrease Oswestry low back pain score to < or = to 40% impairment to improve QOL         Summary of Evaluation:   The patient reports low back pain with radicular symptoms since . Since onset the patient has completed 2 bouts of physical therapy with mild benefit and epidural / cortisone injections with mild benefit. The patient reports increased symptoms over the past couple months leading her to physical therapy.  The patient's symptoms are described as shap, ache and burning sensation in the left side of the lumbar spine and down the left LE extending past the knee. Standing and walking increase symptoms while sitting with legs crossed and bending forward improve symptoms. The patient would like to avoid surgery, return to gardening and improve endurance with therapy services. Subjective:    Zahra Galdamez arrives to therapy stating that she went to her sisters last weekend and was in a different bed and sort of slipped on her new huan as well and that really bothered her back, this is why she cancelled her appt on Wednesday, she was just in too much pain. The pain remains pretty elevated and she states that she hasn't really felt much relief from therapy thus far. The last 4 days the pain is only in the L SI joint region and has numbness/tingling in the lower leg and foot. Any changes in Ambulatory Summary Sheet? None        Objective:  COVID screening questions were asked and patient attested that there had been no contact or symptoms    Pain initially with leg pull but this subsided after a few seconds. Does not reduce radic symptoms. Exercises: (No more than 4 columns)   Exercise/Equipment 9/27/2022 #4 9/30/2022 #5 10/7/2022 #6           WARM UP      Nu-step  L 3 5' L3 5'          TABLE      TA      PPT      PPT w/ mini march      Seated nerve glide ( LAQ w/ AP      Bent knee fallout w/ TA      Prone quad stretch      Left lumbar facet gapping  2.5'  6'  6'    Seated lumbar roll out  Forward 10x  Right 10x Fwd/R 10* ea Fwd/R 10* ea          STANDING      Heel raises                                               PROPRIOCEPTION                                    MODALITIES                      Other Therapeutic Activities/Education:     Home Exercise Program:  Access Code: [email or web address]  URL: [email or web address]  Date: 09/16/2022  Prepared by: Donte Brennan PTA    Exercises  Supine Hip Adduction Isometric with Ball - 1-2 x daily - 7 x weekly - 2 sets - 10 reps - 5 sec hold  Supine Transversus Abdominis Bracing - Hands on Stomach - 1-2 x daily - 7 x weekly - 2 sets - 10 reps - 5 sec hold  Supine Posterior Pelvic Tilt - 1-2 x daily - 7 x weekly - 2 sets - 10 reps - 5 sec hold  Supine March with Posterior Pelvic Tilt - 1-2 x daily - 7 x weekly - 2 sets - 10 reps - 5 sec hold  Bent Knee Fallouts - 1-2 x daily - 7 x weekly - 2 sets - 10 reps - 2-3 sec hold        Manual Treatments:  TPR to L gluteals, leg pull in neutral position        Modalities:  none       Communication with other providers:  none       Assessment: Le tolerated today's session fair. She does not appear to get lasting relief from any treatments provided in PT.  End session pain:  8-9/10      Plan for Next Session:        Time In / Time Out:   2338/0719     If C[employer] Please Indicate Units for Rx this date and running total for each and overall total for Rx to date:  CPT Code Units today Running Total Units Total approved    TE 88252  2 6    MAN 21994   6    Gait 97773      NR 58778  1    TA  [address] [place] [address] [id number]      [facility] [place] [address]      VASO 57160       ADL/Self care 17005      DNT 1-2 56542      DNT 3-4 20561      Other:     1           Total for episode of care   14 90           Timed Code/Total Treatment Minutes:  30' 2 TE       Next Progress Note due:  6th visit       Plan of Care Interventions:  [x] Therapeutic Exercise  [] Modalities:  [x] Therapeutic Activity     [] Ultrasound  [] Estim  [x] Gait Training      [] Cervical Traction [] Lumbar Traction  [x] Neuromuscular Re-education    [x] Cold/hotpack [] Iontophoresis   [x] Instruction in HEP      [] Vasopneumatic   [x] Dry Needling    [x] Manual Therapy               [] Aquatic Therapy              Electronically signed by:  Zehra Castillo PTA             10/7/2022,6:56 AM

## 2022-10-11 ENCOUNTER — HOSPITAL ENCOUNTER (OUTPATIENT)
Dept: PHYSICAL THERAPY | Age: 52
Setting detail: THERAPIES SERIES
Discharge: HOME | End: 2022-10-11
Payer: COMMERCIAL

## 2022-10-11 PROCEDURE — 97530 THERAPEUTIC ACTIVITIES: CPT

## 2022-10-11 PROCEDURE — 97140 MANUAL THERAPY 1/> REGIONS: CPT

## 2022-10-11 NOTE — PROGRESS NOTES
Outpatient Physical Therapy           [place]           [x] Phone: [phone]   Fax: [phone]  [facility]           [] Phone: [phone]   Fax: [phone]      To: NEGRA Li NP     From: Eduardo Gaytan PT, PT     Patient: Helena Christensen                    : 1970  Diagnosis:  Radiculopathy, lumbar region [M54.16]        Treatment Diagnosis:       Date: 10/11/2022  [x]  Progress Note                []  Discharge Note    Evaluation Date:  22   Total Visits to date: 7   Cancels/No-shows to date:  0    Subjective:  Patient reports that she was making some improvement with therapy before jamming her back when slipping on some steps. No fall occurred but the patient started experiencing increased symptoms since. Facet gapping has been improving her sleep and she feels less stiff in the morning. Pain: 8/10      Plan of Care/Treatment to date:  [x] Therapeutic Exercise    [] Modalities:  [x] Therapeutic Activity     [] Ultrasound  [] Electrical Stimulation  [x] Gait Training      [] Cervical Traction   [] Lumbar Traction  [x] Neuromuscular Re-education  [x] Cold/hotpack [] Iontophoresis  [x] Instruction in HEP      Other:  [x] Manual Therapy       [x]  Vasopneumatic  [] Aquatic Therapy       []   Dry Needle Therapy                      Objective/Significant Findings At Last Visit/Comments:      Left Strength  Right Strength      Strength LLE  L Hip Flexion: 4-/5  L Hip Extension: 4-/5  L Hip ABduction: 4-/5  L Knee Flexion: 4-/5  L Knee Extension: 4+/5  L Ankle Dorsiflexion: 4+/5 Strength RLE  R Hip Flexion: 4/5  R Hip Extension: 4/5  R Hip ABduction: 4/5  R Knee Flexion: 4/5  R Knee Extension: 4+/5  R Ankle Dorsiflexion: 5/5          Lumbar Assessment   AROM Lumbar Spine   Flexion: Min (Pain)  Extension: Max  Lateral Flexion Right: Min  Lateral Flexion Left: Min  Right Rotation:  Mod  Left Rotation: min      Assessment:   Patient would benefit from skilled physical therapy services in order to: The patient is a 48 y/o female that originally presented to therapy with c/o LBP and LE weakness. The patient presents with improvements in lumbar ROM and ambulatory tolerance / endurance. The patient continues to have limitation in LE strength with pain as a barrier to therapy progressions. The patient will continue with therapy for two weeks focusing on LE strengthening. If the patient's pain continues to be a barrier to therapy progressions the patient will be re-assessed and proper referral will be made. Goal Status:  [] Achieved [] Partially Achieved  [] Not Achieved     Patient goals: 6 treatments  Short term goals  Time Frame for Short term goals: The patient will increase left hip strength to > or = to 4/5   Not Met: 4/5  Patient will be able to walk > 1000 ft during 6 minute walk test   Met: 1157.7 ft  Patient will be able to correctly use straight cane during ambulation for long distance walks   Met  Patint will be independent in HEP   Met  Long Term Goals  Time Frame for Long Term Goals: 12 treatments  Patient will be able to walk > 1500 feet during 6 minute walk test  Patient will increase left LE strength to > or = to 4+/5  Patient will be independent in the management of her LBP / radicular symptoms  Patient will decrease Oswestry low back pain score to < or = to 40% impairment to improve QOL      Frequency/Duration:  # Days per week: [] 1 day # Weeks: [] 1 week [] 4 weeks [] 8 weeks     [x] 2 days   [x] 2 weeks [] 5 weeks [] 10 weeks     [] 3 days   [] 3 weeks [] 6 weeks [] 12 weeks       Rehab Potential: [] Excellent [x] Good [] Fair  [] Poor         Patient Status: [x] Continue per initial plan of Care     [] Patient now discharged     [] Additional visits requested, Please re-certify for additional visits: If we are requesting more visits, we fully anticipate the patient's condition is expected to improve within the treatment timeframe we are requesting.     Electronically signed by: Evie Miller, PT, DPT, 10/11/2022, 10:29 AM    If you have any questions or concerns, please don't hesitate to call.   Thank you for your referral.    Physician Signature:______________________ Date:______ Time: ________  By signing above, therapists plan is approved by physician

## 2022-10-11 NOTE — FLOWSHEET NOTE
Outpatient Physical Therapy  [place]           [x] Phone: [phone]   Fax: [phone]  [place]           [] Phone: [phone]   Fax: [phone]        Physical Therapy Daily Treatment Note  Date:  10/11/2022    Patient Name:  Regulo Austin    :  1970  MRN: [id number]  Restrictions/Precautions: No data recorded      Diagnosis:     Radiculopathy, lumbar region [M54.16] Diagnosis: Low Back Pain  Date of Injury/Surgery: 2009  Treatment Diagnosis:   APPROVED 90 UNITS PER CPT BY 2022 EXCEPT DRY NEEDLING PENDING. [facility]#[id number]  Insurance/Certification information: Annamaria Acharya       Referring Physician:  NEGRA Mckinnon NP, APRN - NP   PCP: Elizabeth Tate PA-C  Next Doctor Visit:    Plan of care signed (Y/N):    Outcome Measure: Oswestry 60% impaired  Visit# / total visits:   Pain level: 9/10       Goals:     Patient goals: 6 treatments  Short term goals  Time Frame for Short term goals: The patient will increase left hip strength to > or = to 4/5     Patient will be able to walk > 1000 ft during 6 minute walk test  Patient will be able to correctly use straight cane during ambulation for long distance walks  Patint will be independent in HEP     Long Term Goals  Time Frame for Long Term Goals: 12 treatments  Patient will be able to walk > 1500 feet during 6 minute walk test  Patient will increase left LE strength to > or = to 4+/5  Patient will be independent in the management of her LBP / radicular symptoms  Patient will decrease Oswestry low back pain score to < or = to 40% impairment to improve QOL         Summary of Evaluation:   The patient reports low back pain with radicular symptoms since . Since onset the patient has completed 2 bouts of physical therapy with mild benefit and epidural / cortisone injections with mild benefit. The patient reports increased symptoms over the past couple months leading her to physical therapy.  The patient's symptoms are described as shap, ache and burning sensation in the left side of the lumbar spine and down the left LE extending past the knee. Standing and walking increase symptoms while sitting with legs crossed and bending forward improve symptoms. The patient would like to avoid surgery, return to gardening and improve endurance with therapy services. Subjective: Patient reports that she was making some improvement with therapy before jamming her back when slipping on some steps. No fall occurred but the patient started experiencing increased symptoms since. Facet gapping has been improving her sleep and she feels less stiff in the morning. Pain: 8/10           Any changes in Ambulatory Summary Sheet? None        Objective:  COVID screening questions were asked and patient attested that there had been no contact or symptoms    Pain initially with leg pull but this subsided after a few seconds. Does not reduce radic symptoms. Exercises: (No more than 4 columns)   Exercise/Equipment 9/27/2022 #4 9/30/2022 #5 10/7/2022 #6 10/11/22 #7            WARM UP       Nu-step  L 3 5' L3 5'            TABLE       TA       PPT       PPT w/ mini march       Seated nerve glide ( LAQ w/ AP       Bent knee fallout w/ TA       Prone quad stretch       Left lumbar facet gapping  2.5'  6'  6'     Seated lumbar roll out  Forward 10x  Right 10x Fwd/R 10* ea Fwd/R 10* ea  Fwd/R 10* ea           STANDING       Heel raises                                                      PROPRIOCEPTION                                          MODALITIES                         Other Therapeutic Activities/Education:     Home Exercise Program:  Access Code: [email or web address]  URL: [email or web address]  Date: 09/16/2022  Prepared by: Shay Carpenter PTA    Exercises  Supine Hip Adduction Isometric with Ball - 1-2 x daily - 7 x weekly - 2 sets - 10 reps - 5 sec hold  Supine Transversus Abdominis Bracing - Hands on Stomach - 1-2 x daily - 7 x weekly - 2 sets - 10 reps - 5 sec hold  Supine Posterior Pelvic Tilt - 1-2 x daily - 7 x weekly - 2 sets - 10 reps - 5 sec hold  Supine March with Posterior Pelvic Tilt - 1-2 x daily - 7 x weekly - 2 sets - 10 reps - 5 sec hold  Bent Knee Fallouts - 1-2 x daily - 7 x weekly - 2 sets - 10 reps - 2-3 sec hold        Manual Treatments:    STM of left lumbar paraspinals with MFR  n        Modalities:  HP x 5'      Communication with other providers:  none       Assessment: Patient would benefit from skilled physical therapy services in order to: The patient is a 46 y/o female that originally presented to therapy with c/o LBP and LE weakness. The patient presents with improvements in lumbar ROM and ambulatory tolerance / endurance. The patient continues to have limitation in LE strength with pain as a barrier to therapy progressions. The patient will continue with therapy for two weeks focusing on LE strengthening. If the patient's pain continues to be a barrier to therapy progressions the patient will be re-assessed and proper referral will be made.      Plan for Next Session:        Time In / Time Out:   1030/1115    If C[employer] Please Indicate Units for Rx this date and running total for each and overall total for Rx to date:  CPT Code Units today Running Total Units Total approved    TE 58608   6    MAN 18681  1 7    Gait 58595      NR 50928  1    TA  23043 1 1    Estim Unatt 11999       [facility] [id number]      [facility] [address]       ADL/Self care 01262      DNT 1-2 72499      DNT 3-4 28885      Other:     1           Total for episode of care   16 90           Timed Code/Total Treatment Minutes:  40'/45' TA x 1 Man x 1       Next Progress Note due:  6th visit       Plan of Care Interventions:  [x] Therapeutic Exercise  [] Modalities:  [x] Therapeutic Activity     [] Ultrasound  [] Estim  [x] Gait Training      [] Cervical Traction [] Lumbar Traction  [x] Neuromuscular Re-education    [x] Cold/hotpack [] Iontophoresis   [x] Instruction in HEP      [] Vasopneumatic   [x] Dry Needling    [x] Manual Therapy               [] Aquatic Therapy              Electronically signed by:   Darcy Guerrero PT, DPT             10/11/2022,10:28 AM

## 2022-10-14 ENCOUNTER — HOSPITAL ENCOUNTER (OUTPATIENT)
Dept: PHYSICAL THERAPY | Age: 52
Setting detail: THERAPIES SERIES
Discharge: HOME | End: 2022-10-14
Payer: COMMERCIAL

## 2022-10-14 ENCOUNTER — HOSPITAL ENCOUNTER (OUTPATIENT)
Dept: CT IMAGING | Age: 52
Discharge: HOME | End: 2022-10-14
Payer: COMMERCIAL

## 2022-10-14 DIAGNOSIS — R63.4 WEIGHT LOSS, UNINTENTIONAL: ICD-10-CM

## 2022-10-14 PROCEDURE — 74177 CT ABD & PELVIS W/CONTRAST: CPT

## 2022-10-14 PROCEDURE — 6360000004 HC RX CONTRAST MEDICATION: Performed by: SPECIALIST

## 2022-10-14 PROCEDURE — A4641 RADIOPHARM DX AGENT NOC: HCPCS | Performed by: SPECIALIST

## 2022-10-14 PROCEDURE — 97110 THERAPEUTIC EXERCISES: CPT

## 2022-10-14 RX ORDER — SODIUM CHLORIDE 0.9 % (FLUSH) 0.9 %
5-40 SYRINGE (ML) INJECTION PRN
Status: DISCONTINUED | OUTPATIENT
Start: 2022-10-14 | End: 2022-10-15 | Stop reason: HOSPADM

## 2022-10-14 RX ADMIN — BARIUM SULFATE 900 ML: 20 SUSPENSION ORAL at 12:35

## 2022-10-14 RX ADMIN — IOPAMIDOL 75 ML: 755 INJECTION, SOLUTION INTRAVENOUS at 13:40

## 2022-10-14 NOTE — FLOWSHEET NOTE
Outpatient Physical Therapy  [place]           [x] Phone: [phone]   Fax: [phone]  [place]           [] Phone: [phone]   Fax: [phone]        Physical Therapy Daily Treatment Note  Date:  10/14/2022    Patient Name:  Paul Ibrahim    :  1970  MRN: [id number]  Restrictions/Precautions: No data recorded      Diagnosis:     Radiculopathy, lumbar region [M54.16] Diagnosis: Low Back Pain  Date of Injury/Surgery: 2009  Treatment Diagnosis:   APPROVED 90 UNITS PER CPT BY 2022 EXCEPT DRY NEEDLING PENDING. GNXN#[id number]  Insurance/Certification information: Reshma Cornelius       Referring Physician:  NEGRA Miranda NP, APRN - NP   PCP: Paty Kemp PA-C  Next Doctor Visit:    Plan of care signed (Y/N):    Outcome Measure: Oswestry 60% impaired  Visit# / total visits:   Pain level: 9/10       Goals:     Patient goals: 6 treatments  Short term goals  Time Frame for Short term goals: The patient will increase left hip strength to > or = to 4/5     Patient will be able to walk > 1000 ft during 6 minute walk test  Patient will be able to correctly use straight cane during ambulation for long distance walks  Patint will be independent in HEP     Long Term Goals  Time Frame for Long Term Goals: 12 treatments  Patient will be able to walk > 1500 feet during 6 minute walk test  Patient will increase left LE strength to > or = to 4+/5  Patient will be independent in the management of her LBP / radicular symptoms  Patient will decrease Oswestry low back pain score to < or = to 40% impairment to improve QOL         Summary of Evaluation:   The patient reports low back pain with radicular symptoms since . Since onset the patient has completed 2 bouts of physical therapy with mild benefit and epidural / cortisone injections with mild benefit. The patient reports increased symptoms over the past couple months leading her to physical therapy.  The patient's symptoms are described as shap, ache and burning sensation in the left side of the lumbar spine and down the left LE extending past the knee. Standing and walking increase symptoms while sitting with legs crossed and bending forward improve symptoms. The patient would like to avoid surgery, return to gardening and improve endurance with therapy services. Subjective: Delmy Lazo arrives to therapy stating that her back is really hurting today. She did laundry yesterday and that really bothered her back. Any changes in Ambulatory Summary Sheet? None      Objective:  COVID screening questions were asked and patient attested that there had been no contact or symptoms      Cues for proper activation of abdominal muscles during table exercises. Exercises: (No more than 4 columns) STAIRS  Exercise/Equipment 10/14/2022 #8           WARM UP     Nu-step  L5 5'         TABLE     TA 10*5\"     TA with ball squeeze  10*5\"     TA with march  2*10 ea LE     TA with bent knee fall out  10* ea    Bridge  2*10 mini     Ball roll outs  5*5\" ea dir R/L/F    STS 2*10              STANDING     OH pull down with TA  GTB 2*10    Palloff press  -    Hip abd  2*10 ea side     Lateral step up  6\" 10* ea                        PROPRIOCEPTION                              MODALITIES                   Other Therapeutic Activities/Education:     Home Exercise Program:    Access Code: [email or web address]  URL: [email or web address]  Date: 09/16/2022  Prepared by: Shay Carpenter PTA    Exercises  Supine Hip Adduction Isometric with Ball - 1-2 x daily - 7 x weekly - 2 sets - 10 reps - 5 sec hold  Supine Transversus Abdominis Bracing - Hands on Stomach - 1-2 x daily - 7 x weekly - 2 sets - 10 reps - 5 sec hold  Supine Posterior Pelvic Tilt - 1-2 x daily - 7 x weekly - 2 sets - 10 reps - 5 sec hold  Supine March with Posterior Pelvic Tilt - 1-2 x daily - 7 x weekly - 2 sets - 10 reps - 5 sec hold  Bent Knee Fallouts - 1-2 x daily - 7 x weekly - 2 sets - 10 reps - 2-3 sec hold        Manual Treatments:    n        Modalities:      Communication with other providers:  none       Assessment: Le tolerated today's session well. She arrives to therapy with high levels of pain; however, is able to move through exercises without pain behaviors. End session pain: 8/10 less stiffness     Plan for Next Session: Focus on LE and core strengthening for the next several visits, then D/C, per instructions from primary PT.     Time In / Time Out:  1111/1156    If [employer] Please Indicate Units for Rx this date and running total for each and overall total for Rx to date:  CPT Code Units today Running Total Units Total approved    TE 77789  3 9    MAN 78830   7    Gait 62047      NR 49237  1    TA  03972  1    Estim Unatt 12911        [id number]      [facility] [place] [id number]      VASO 97903       ADL/Self care 26108      DNT 1-2 08866      DNT 3-4 32254      Other:     1           Total for episode of care   19 90           Timed Code/Total Treatment Minutes:  39' 3 TE       Next Progress Note due:  6th visit       Plan of Care Interventions:  [x] Therapeutic Exercise  [] Modalities:  [x] Therapeutic Activity     [] Ultrasound  [] Estim  [x] Gait Training      [] Cervical Traction [] Lumbar Traction  [x] Neuromuscular Re-education    [x] Cold/hotpack [] Iontophoresis   [x] Instruction in HEP      [] Vasopneumatic   [x] Dry Needling    [x] Manual Therapy               [] Aquatic Therapy              Electronically signed by:  Helen Quinn PTA    7:33 AM  10/14/2022               10/14/2022,7:30 AM

## 2022-10-17 ENCOUNTER — TELEPHONE (OUTPATIENT)
Dept: GASTROENTEROLOGY | Age: 52
End: 2022-10-17

## 2022-10-18 NOTE — FLOWSHEET NOTE
Patients Plan of Care was received and signed. Signed POC was scanned and placed in the patients chart.     Beverly Garsia

## 2022-10-19 ENCOUNTER — HOSPITAL ENCOUNTER (OUTPATIENT)
Dept: PHYSICAL THERAPY | Age: 52
Discharge: HOME | End: 2022-10-19

## 2022-10-19 ENCOUNTER — TELEPHONE (OUTPATIENT)
Dept: GASTROENTEROLOGY | Age: 52
End: 2022-10-19

## 2022-10-19 NOTE — FLOWSHEET NOTE
Physical Therapy  Cancellation/No-show Note  Patient Name:  Lindsey Duncan  :  1970   Date:  10/19/2022  Cancelled visits to date: 1  No-shows to date: 0    For today's appointment patient:  [x]  Cancelled  []  Rescheduled appointment  []  No-show     Reason given by patient:  [x]  Patient ill  []  Conflicting appointment  []  No transportation    []  Conflict with work  []  No reason given  []  Other:     Comments:      Electronically signed by:  Christiana Dye    12:22 PM  10/19/2022

## 2022-10-25 ENCOUNTER — HOSPITAL ENCOUNTER (OUTPATIENT)
Dept: PHYSICAL THERAPY | Age: 52
Setting detail: THERAPIES SERIES
Discharge: HOME | End: 2022-10-25
Payer: COMMERCIAL

## 2022-10-25 ENCOUNTER — OFFICE VISIT (OUTPATIENT)
Dept: OBGYN | Age: 52
End: 2022-10-25
Payer: COMMERCIAL

## 2022-10-25 VITALS
SYSTOLIC BLOOD PRESSURE: 129 MMHG | BODY MASS INDEX: 21.62 KG/M2 | WEIGHT: 146 LBS | HEIGHT: 69 IN | DIASTOLIC BLOOD PRESSURE: 86 MMHG

## 2022-10-25 DIAGNOSIS — N94.89 ADNEXAL MASS: Primary | ICD-10-CM

## 2022-10-25 LAB — CA 125: 14 U/ML (ref 0–35)

## 2022-10-25 PROCEDURE — 4004F PT TOBACCO SCREEN RCVD TLK: CPT | Performed by: OBSTETRICS & GYNECOLOGY

## 2022-10-25 PROCEDURE — 36415 COLL VENOUS BLD VENIPUNCTURE: CPT | Performed by: OBSTETRICS & GYNECOLOGY

## 2022-10-25 PROCEDURE — 97110 THERAPEUTIC EXERCISES: CPT

## 2022-10-25 PROCEDURE — G8420 CALC BMI NORM PARAMETERS: HCPCS | Performed by: OBSTETRICS & GYNECOLOGY

## 2022-10-25 PROCEDURE — 99214 OFFICE O/P EST MOD 30 MIN: CPT | Performed by: OBSTETRICS & GYNECOLOGY

## 2022-10-25 PROCEDURE — G8484 FLU IMMUNIZE NO ADMIN: HCPCS | Performed by: OBSTETRICS & GYNECOLOGY

## 2022-10-25 PROCEDURE — 97140 MANUAL THERAPY 1/> REGIONS: CPT

## 2022-10-25 PROCEDURE — 3017F COLORECTAL CA SCREEN DOC REV: CPT | Performed by: OBSTETRICS & GYNECOLOGY

## 2022-10-25 PROCEDURE — G8427 DOCREV CUR MEDS BY ELIG CLIN: HCPCS | Performed by: OBSTETRICS & GYNECOLOGY

## 2022-10-25 NOTE — FLOWSHEET NOTE
described as shap, ache and burning sensation in the left side of the lumbar spine and down the left LE extending past the knee. Standing and walking increase symptoms while sitting with legs crossed and bending forward improve symptoms. The patient would like to avoid surgery, return to gardening and improve endurance with therapy services. Subjective: Pt stated that her pain was 9/10 today. Pt stated that she has fallen 4 times since start of therapy. Pt stated that she has pain on the L side of her low back. Pt stated that she had pain from knee to foot ( L) after her last visit. Pt stated that she fell in the shower and hurt her R shin. Pt stated that she has to have 6 weeks of therapy before insurance will do MRI. PT stated that she has had 35 # weight loss this year without trying and that she has a cyst on her L ovary. PT stated that she is scheduled to get an US for cyst.       Any changes in Ambulatory Summary Sheet? None      Objective:  COVID screening questions were asked and patient attested that there had been no contact or symptoms      Cues for proper activation of abdominal muscles during table exercises.    Extremely tight hip rotators R > L   L upslip noted and corrected with MET  Exercises: (No more than 4 columns) STAIRS  Exercise/Equipment 10/14/2022 #8 10/25/2022 #9          WARM UP     Nu-step  L5 5' L-5 x 5'        TABLE     TA 10*5\"  10x2 5\"    TA with ball squeeze  10*5\"  10x2 5\"    TA with march  2*10 ea LE     TA with bent knee fall out  10* ea TA 10x2 w/ OP   Bridge  2*10 mini  10x2    Ball roll outs  5*5\" ea dir R/L/F    STS 2*10 10x2   Seated,trunk rotation   10x ea side        STANDING     OH pull down with TA  GTB 2*10    Palloff press  -    Hip abd  2*10 ea side     Lateral step up  6\" 10* ea                        PROPRIOCEPTION                              MODALITIES                   Other Therapeutic Activities/Education:     Home Exercise Program:    Access Code: [email or web address]  URL: [employer]. com/  Date: 09/16/2022  Prepared by: Beltran Delgado PTA    Exercises  Supine Hip Adduction Isometric with Ball - 1-2 x daily - 7 x weekly - 2 sets - 10 reps - 5 sec hold  Supine Transversus Abdominis Bracing - Hands on Stomach - 1-2 x daily - 7 x weekly - 2 sets - 10 reps - 5 sec hold  Supine Posterior Pelvic Tilt - 1-2 x daily - 7 x weekly - 2 sets - 10 reps - 5 sec hold  Supine March with Posterior Pelvic Tilt - 1-2 x daily - 7 x weekly - 2 sets - 10 reps - 5 sec hold  Bent Knee Fallouts - 1-2 x daily - 7 x weekly - 2 sets - 10 reps - 2-3 sec hold        Manual Treatments:    PROM to B hips  MET to correct upslip         Modalities:       Communication with other providers:  none       Assessment: Pt tolerated treatment fair. Pt was able to get more hip motion after manual,but continues to be limited. Pt rated pain at 8/10 after treatment     Plan for Next Session: Focus on LE and core strengthening for the next several visits, then D/C, per instructions from primary PT.     Time In / Time Out: 1300/1345    If [employer] Please Indicate Units for Rx this date and running total for each and overall total for Rx to date:  CPT Code Units today Running Total Units Total approved    TE 17898  1 10    MAN 97392  2 9    Gait 31546      NR 89617  1    TA  45930  1    Estim Unatt 15614        [address]      [facility] [place] [phone]      VASO 85427       ADL/Self care 08536      DNT 1-2 31349      DNT 3-4 20561      Other:     1           Total for episode of care   22 90           Timed Code/Total Treatment Minutes:   45'/45' 2 man (23') 1 TE ( 22')       Next Progress Note due:  6th visit       Plan of Care Interventions:  [x] Therapeutic Exercise  [] Modalities:  [x] Therapeutic Activity     [] Ultrasound  [] Estim  [x] Gait Training      [] Cervical Traction [] Lumbar Traction  [x] Neuromuscular Re-education    [x] Cold/hotpack [] Iontophoresis   [x] Instruction in HEP      [] Vasopneumatic [x] Dry Needling    [x] Manual Therapy               [] Aquatic Therapy              Electronically signed by:  Devendra Padron PTA    12:54 PM  10/25/2022       10/25/2022,7:24 PM

## 2022-10-25 NOTE — PROGRESS NOTES
10/25/22    Joy Hernandez  1970    Chief Complaint   Patient presents with    Follow-up     Pt saw Dr Margery Romberg d/t weight loss, He ordered a CT scan that showed In the left adnexa, a bilobed mass can be seen measuring 7.4 x 3 cm. This appears solid and not cystic. He told her to follow up with GYN. Pt denies pelvic pain. Joy Hernandez is a 47 y.o. female who presents today for evaluation of left adnexal mass. Left hip pain. No left lq abdominal pain. Past Medical History:   Diagnosis Date    Abnormal Pap smear of cervix     mild dysplasia, cryo 2017     Arthritis     back    COPD (chronic obstructive pulmonary disease) (HCC)     Fibromyalgia     Graves disease     Hypertension        Past Surgical History:   Procedure Laterality Date    COLONOSCOPY      GYNECOLOGIC CRYOSURGERY      THYROIDECTOMY      TUBAL LIGATION         Social History     Tobacco Use    Smoking status: Every Day     Packs/day: 1.00     Types: Cigarettes     Last attempt to quit: 2018     Years since quittin.5    Smokeless tobacco: Never   Substance Use Topics    Alcohol use: Yes     Comment: occas       No family history on file. Current Outpatient Medications   Medication Sig Dispense Refill    EUTHYROX 112 MCG tablet       topiramate (TOPAMAX) 25 MG tablet TAKE 1 TABLET BY MOUTH TWICE DAILY      estradiol (ESTRACE) 1 MG tablet Take 1 tablet by mouth daily 90 tablet 3    progesterone (PROMETRIUM) 200 MG CAPS capsule TAKE 1 CAPSULE BY MOUTH ONCE DAILY 90 capsule 3    fluticasone (FLOVENT HFA) 110 MCG/ACT inhaler Inhale 2 puffs into the lungs 2 times daily 1 each 5    albuterol sulfate HFA (VENTOLIN HFA) 108 (90 Base) MCG/ACT inhaler Inhale 2 puffs into the lungs in the morning and 2 puffs at noon and 2 puffs in the evening and 2 puffs before bedtime.  18 g 5    FLOVENT  MCG/ACT inhaler Inhale 2 puffs by mouth twice daily 12 g 5    losartan (COZAAR) 50 MG tablet Take 50 mg by mouth daily DULoxetine (CYMBALTA) 30 MG extended release capsule Take 30 mg by mouth daily      fluticasone (FLOVENT HFA) 110 MCG/ACT inhaler Inhale 2 puffs into the lungs 2 times daily 1 Inhaler 5    cyclobenzaprine (FLEXERIL) 5 MG tablet Take 5 mg by mouth 3 times daily as needed for Muscle spasms      ibuprofen (ADVIL;MOTRIN) 800 MG tablet Take 800 mg by mouth every 6 hours as needed for Pain      albuterol sulfate HFA (VENTOLIN HFA) 108 (90 Base) MCG/ACT inhaler Inhale 2 puffs into the lungs every 6 hours as needed for Wheezing        No current facility-administered medications for this visit. No Known Allergies    No obstetric history on file. There is no immunization history on file for this patient. Review of Systems    /86   Ht 5' 9\" (1.753 m)   Wt 146 lb (66.2 kg)   LMP 04/25/2017   BMI 21.56 kg/m²     Physical Exam  Constitutional:       General: She is not in acute distress. Appearance: Normal appearance. She is not ill-appearing. HENT:      Head: Normocephalic. Nose: Nose normal.   Eyes:      General: No scleral icterus. Right eye: No discharge. Left eye: No discharge. Cardiovascular:      Pulses: Normal pulses. Pulmonary:      Effort: Pulmonary effort is normal.   Abdominal:      General: Abdomen is flat. There is no distension. Palpations: Abdomen is soft. There is no mass. Tenderness: There is no abdominal tenderness. Hernia: No hernia is present. Musculoskeletal:         General: Normal range of motion. Cervical back: Normal range of motion and neck supple. No rigidity. Skin:     General: Skin is warm and dry. Neurological:      General: No focal deficit present. Mental Status: She is alert and oriented to person, place, and time. Psychiatric:         Mood and Affect: Mood normal.         Behavior: Behavior normal.     Reviewed ct scan result      No results found for this visit on 10/25/22.     ASSESSMENT AND PLAN   Diagnosis Orders   1. Adnexal mass      US NON OB TRANSVAGINAL        Discussed possible benign versus malignancy  Return in about 1 week (around 11/1/2022).     Janine Shine MD

## 2022-11-02 ENCOUNTER — HOSPITAL ENCOUNTER (OUTPATIENT)
Dept: PHYSICAL THERAPY | Age: 52
Setting detail: THERAPIES SERIES
Discharge: HOME | End: 2022-11-02
Payer: COMMERCIAL

## 2022-11-02 PROCEDURE — 97530 THERAPEUTIC ACTIVITIES: CPT

## 2022-11-02 NOTE — PROGRESS NOTES
Outpatient Physical Therapy           [place]           [x] Phone: [phone]   Fax: [phone]  [place]           [] Phone: [phone]   Fax: [phone]      To: NEGRA Caputo NP     From: David Phillips PT, PT     Patient: Favian Oleary                    : 1970  Diagnosis:  Radiculopathy, lumbar region [M54.16]        Treatment Diagnosis:       Date: 2022  [x]  Progress Note                []  Discharge Note    Evaluation Date:  22   Total Visits to date: 8   Cancels/No-shows to date:  0    Subjective: Patient states that she had had a busy day filled with Doctors appointments. The patient has recently been diagnosed with a mass on her left ovary and is currently undergoing testing. Pain continues to be high. She is managing symptoms at home with positional changes. Feels that some of the exercises making her pain worse. Pain: 9/10      Plan of Care/Treatment to date:  [x] Therapeutic Exercise    [] Modalities:  [x] Therapeutic Activity     [] Ultrasound  [] Electrical Stimulation  [x] Gait Training      [] Cervical Traction   [] Lumbar Traction  [x] Neuromuscular Re-education  [x] Cold/hotpack [] Iontophoresis  [x] Instruction in HEP      Other:  [x] Manual Therapy       [x]  Vasopneumatic  [] Aquatic Therapy       []   Dry Needle Therapy                      Objective/Significant Findings At Last Visit/Comments:      Left Strength  Right Strength      Strength LLE  L Hip Flexion: 4/5  L Hip Extension: 4/5  L Hip ABduction: 4+/5  L Knee Flexion: 4+/5  L Knee Extension: 4+/5  L Ankle Dorsiflexion: 4+/5 Strength RLE  R Hip Flexion: 4+/5  R Hip Extension: 4/5  R Hip ABduction: 4/5  R Knee Flexion: 4+/5  R Knee Extension: 5/5  R Ankle Dorsiflexion: 5/5          Lumbar Assessment   AROM Lumbar Spine   Flexion: Min (Pain)  Extension: Max  Lateral Flexion Right: Min  Lateral Flexion Left: Min  Right Rotation:  Mod  Left Rotation: min      Assessment:   Patient would benefit from skilled physical therapy services in order to: The patient is a 48 y/o female that originally presented to therapy with c/o LBP and LE weakness. The patient presents with minimal improvements in pain symptoms but has improved in strength and endurance from initial evaluation. Minimal improvement seen in endurance and pain symptoms since last progress note. Due to minimal progression since last progress note the patient will be placed on a PT hold (30 days) until she is seen by pain management. If the patient does not return to therapy within 30 days she will be discharged. Goal Status:  [] Achieved [] Partially Achieved  [] Not Achieved     Patient goals: 6 treatments  Short term goals  Time Frame for Short term goals: The patient will increase left hip strength to > or = to 4/5   Not Met: 4/5  Patient will be able to walk > 1000 ft during 6 minute walk test   Met: 115 ft  Patient will be able to correctly use straight cane during ambulation for long distance walks   Met  Patint will be independent in HEP   Met  Long Term Goals  Time Frame for Long Term Goals: 12 treatments  Patient will be able to walk > 1500 feet during 6 minute walk test   Net Met: 1232.4  Patient will increase left LE strength to > or = to 4+/5   Not Met   Patient will be independent in the management of her LBP / radicular symptoms   Met  Patient will decrease Oswestry low back pain score to < or = to 40% impairment to improve QOL   Not Met    Frequency/Duration:  # Days per week: [] 1 day # Weeks: [] 1 week [] 4 weeks [] 8 weeks     [] 2 days   [] 2 weeks [] 5 weeks [] 10 weeks     [] 3 days   [] 3 weeks [] 6 weeks [] 12 weeks       Rehab Potential: [] Excellent [x] Good [] Fair  [] Poor         Patient Status: [] Continue per initial plan of Care     [x] Patient placed on PT hold     [] Additional visits requested, Please re-certify for additional visits:          If we are requesting more visits, we fully anticipate the patient's condition is expected to improve within the treatment timeframe we are requesting. Electronically signed by: Dary Nelson PT, DPT, 11/2/2022, 1:58 PM    If you have any questions or concerns, please don't hesitate to call.   Thank you for your referral.    Physician Signature:______________________ Date:______ Time: ________  By signing above, therapists plan is approved by physician

## 2022-11-03 ENCOUNTER — OFFICE VISIT (OUTPATIENT)
Dept: OBGYN | Age: 52
End: 2022-11-03
Payer: COMMERCIAL

## 2022-11-03 VITALS
WEIGHT: 147 LBS | BODY MASS INDEX: 21.77 KG/M2 | HEIGHT: 69 IN | SYSTOLIC BLOOD PRESSURE: 147 MMHG | DIASTOLIC BLOOD PRESSURE: 92 MMHG

## 2022-11-03 DIAGNOSIS — R63.4 WEIGHT LOSS: ICD-10-CM

## 2022-11-03 DIAGNOSIS — N94.89 ADNEXAL MASS: Primary | ICD-10-CM

## 2022-11-03 PROCEDURE — G8420 CALC BMI NORM PARAMETERS: HCPCS | Performed by: OBSTETRICS & GYNECOLOGY

## 2022-11-03 PROCEDURE — 99214 OFFICE O/P EST MOD 30 MIN: CPT | Performed by: OBSTETRICS & GYNECOLOGY

## 2022-11-03 PROCEDURE — 3017F COLORECTAL CA SCREEN DOC REV: CPT | Performed by: OBSTETRICS & GYNECOLOGY

## 2022-11-03 PROCEDURE — G8427 DOCREV CUR MEDS BY ELIG CLIN: HCPCS | Performed by: OBSTETRICS & GYNECOLOGY

## 2022-11-03 PROCEDURE — G8484 FLU IMMUNIZE NO ADMIN: HCPCS | Performed by: OBSTETRICS & GYNECOLOGY

## 2022-11-03 PROCEDURE — 4004F PT TOBACCO SCREEN RCVD TLK: CPT | Performed by: OBSTETRICS & GYNECOLOGY

## 2022-11-03 NOTE — PROGRESS NOTES
11/3/22    Phillip Nicolas  1970    Chief Complaint   Patient presents with    Follow-up     Pt here to discuss u/s and lab results d/t adnexal mass. Phillip Nicolas is a 47 y.o. female who presents today for evaluation of adnexal mass along with weight loss. Past Medical History:   Diagnosis Date    Abnormal Pap smear of cervix     mild dysplasia, cryo 2017     Adnexal mass     Arthritis     back    COPD (chronic obstructive pulmonary disease) (HCC)     Fibromyalgia     Graves disease     Hypertension        Past Surgical History:   Procedure Laterality Date    COLONOSCOPY      GYNECOLOGIC CRYOSURGERY      THYROIDECTOMY      TUBAL LIGATION         Social History     Tobacco Use    Smoking status: Every Day     Packs/day: 1.00     Types: Cigarettes     Last attempt to quit: 2018     Years since quittin.5    Smokeless tobacco: Never   Substance Use Topics    Alcohol use: Yes     Comment: occas       No family history on file. Current Outpatient Medications   Medication Sig Dispense Refill    EUTHYROX 112 MCG tablet       topiramate (TOPAMAX) 25 MG tablet TAKE 1 TABLET BY MOUTH TWICE DAILY      estradiol (ESTRACE) 1 MG tablet Take 1 tablet by mouth daily 90 tablet 3    progesterone (PROMETRIUM) 200 MG CAPS capsule TAKE 1 CAPSULE BY MOUTH ONCE DAILY 90 capsule 3    fluticasone (FLOVENT HFA) 110 MCG/ACT inhaler Inhale 2 puffs into the lungs 2 times daily 1 each 5    albuterol sulfate HFA (VENTOLIN HFA) 108 (90 Base) MCG/ACT inhaler Inhale 2 puffs into the lungs in the morning and 2 puffs at noon and 2 puffs in the evening and 2 puffs before bedtime.  18 g 5    FLOVENT  MCG/ACT inhaler Inhale 2 puffs by mouth twice daily 12 g 5    losartan (COZAAR) 50 MG tablet Take 50 mg by mouth daily      DULoxetine (CYMBALTA) 30 MG extended release capsule Take 30 mg by mouth daily      fluticasone (FLOVENT HFA) 110 MCG/ACT inhaler Inhale 2 puffs into the lungs 2 times daily 1 Inhaler 5

## 2022-11-11 ENCOUNTER — TELEPHONE (OUTPATIENT)
Dept: OBGYN | Age: 52
End: 2022-11-11

## 2022-11-11 NOTE — TELEPHONE ENCOUNTER
Pt called stating she would like to go forward with surgery. Eucrisa Counseling: Patient may experience a mild burning sensation during topical application. Eucrisa is not approved in children less than 2 years of age.

## 2022-11-14 NOTE — DISCHARGE SUMMARY
Outpatient Physical Therapy           [place]           [x] Phone: [phone]   Fax: [phone]  [facility]           [] Phone: [phone]   Fax: [phone]      To: NEGRA Morales NP     From: Porsha Belcher PT, PT     Patient: Christian Rhodes                    : 1970  Diagnosis:  Radiculopathy, lumbar region [M54.16]        Treatment Diagnosis:       Date: 2022  []  Progress Note                [x]  Discharge Note    Evaluation Date:  22   Total Visits to date: 8   Cancels/No-shows to date:  0    Subjective: Patient states that she had had a busy day filled with Doctors appointments. The patient has recently been diagnosed with a mass on her left ovary and is currently undergoing testing. Pain continues to be high. She is managing symptoms at home with positional changes. Feels that some of the exercises making her pain worse. Pain: 9/10      Plan of Care/Treatment to date:  [x] Therapeutic Exercise    [] Modalities:  [x] Therapeutic Activity     [] Ultrasound  [] Electrical Stimulation  [x] Gait Training      [] Cervical Traction   [] Lumbar Traction  [x] Neuromuscular Re-education  [x] Cold/hotpack [] Iontophoresis  [x] Instruction in HEP      Other:  [x] Manual Therapy       [x]  Vasopneumatic  [] Aquatic Therapy       []   Dry Needle Therapy                      Objective/Significant Findings At Last Visit/Comments:      Left Strength  Right Strength      Strength LLE  L Hip Flexion: 4/5  L Hip Extension: 4/5  L Hip ABduction: 4+/5  L Knee Flexion: 4+/5  L Knee Extension: 4+/5  L Ankle Dorsiflexion: 4+/5 Strength RLE  R Hip Flexion: 4+/5  R Hip Extension: 4/5  R Hip ABduction: 4/5  R Knee Flexion: 4+/5  R Knee Extension: 5/5  R Ankle Dorsiflexion: 5/5          Lumbar Assessment   AROM Lumbar Spine   Flexion: Min (Pain)  Extension: Max  Lateral Flexion Right: Min  Lateral Flexion Left: Min  Right Rotation:  Mod  Left Rotation: min      Assessment:   Patient would benefit from skilled physical therapy services in order to: The patient is a 46 y/o female that originally presented to therapy with c/o LBP and LE weakness. The patient presents with minimal improvements in pain symptoms but has improved in strength and endurance from initial evaluation. Minimal improvement seen in endurance and pain symptoms since last progress note. Due to minimal progression since last progress note the patient will be placed on a PT hold (30 days) until she is seen by pain management. Patient will be discharged from therapy due to minimal progression and d/arin with HEP. Goal Status:  [] Achieved [] Partially Achieved  [] Not Achieved     Patient goals: 6 treatments  Short term goals  Time Frame for Short term goals: The patient will increase left hip strength to > or = to 4/5   Not Met: 4/5  Patient will be able to walk > 1000 ft during 6 minute walk test   Met: 115 ft  Patient will be able to correctly use straight cane during ambulation for long distance walks   Met  Patint will be independent in HEP   Met  Long Term Goals  Time Frame for Long Term Goals: 12 treatments  Patient will be able to walk > 1500 feet during 6 minute walk test   Net Met: 1232.4  Patient will increase left LE strength to > or = to 4+/5   Not Met   Patient will be independent in the management of her LBP / radicular symptoms   Met  Patient will decrease Oswestry low back pain score to < or = to 40% impairment to improve QOL   Not Met    Frequency/Duration:  # Days per week: [] 1 day # Weeks: [] 1 week [] 4 weeks [] 8 weeks     [] 2 days   [] 2 weeks [] 5 weeks [] 10 weeks     [] 3 days   [] 3 weeks [] 6 weeks [] 12 weeks       Rehab Potential: [] Excellent [x] Good [] Fair  [] Poor         Patient Status: [] Continue per initial plan of Care     [x] Patient placed on PT hold     [] Additional visits requested, Please re-certify for additional visits:          If we are requesting more visits, we fully anticipate the patient's condition is expected to improve within the treatment timeframe we are requesting. Electronically signed by: Kayy Hidalgo PT, DPT, 11/14/2022, 1:41 PM    If you have any questions or concerns, please don't hesitate to call.   Thank you for your referral.    Physician Signature:______________________ Date:______ Time: ________  By signing above, therapists plan is approved by physician

## 2022-11-14 NOTE — PROGRESS NOTES
Per [facility]  [id number] (LT) and [id number] approved, no prior auth required for 00108  Auth # [id number] 11/14/22-1/31/23  Ready to schedule

## 2022-11-22 ENCOUNTER — OFFICE VISIT (OUTPATIENT)
Dept: OBGYN | Age: 52
End: 2022-11-22
Payer: COMMERCIAL

## 2022-11-22 VITALS
BODY MASS INDEX: 21.92 KG/M2 | DIASTOLIC BLOOD PRESSURE: 84 MMHG | WEIGHT: 148 LBS | SYSTOLIC BLOOD PRESSURE: 121 MMHG | HEIGHT: 69 IN

## 2022-11-22 DIAGNOSIS — N94.89 ADNEXAL MASS: Primary | ICD-10-CM

## 2022-11-22 PROCEDURE — G8484 FLU IMMUNIZE NO ADMIN: HCPCS | Performed by: OBSTETRICS & GYNECOLOGY

## 2022-11-22 PROCEDURE — 3017F COLORECTAL CA SCREEN DOC REV: CPT | Performed by: OBSTETRICS & GYNECOLOGY

## 2022-11-22 PROCEDURE — G8427 DOCREV CUR MEDS BY ELIG CLIN: HCPCS | Performed by: OBSTETRICS & GYNECOLOGY

## 2022-11-22 PROCEDURE — 99213 OFFICE O/P EST LOW 20 MIN: CPT | Performed by: OBSTETRICS & GYNECOLOGY

## 2022-11-22 PROCEDURE — G8420 CALC BMI NORM PARAMETERS: HCPCS | Performed by: OBSTETRICS & GYNECOLOGY

## 2022-11-22 PROCEDURE — 4004F PT TOBACCO SCREEN RCVD TLK: CPT | Performed by: OBSTETRICS & GYNECOLOGY

## 2022-11-22 NOTE — PROGRESS NOTES
22    Nick Shah  1970    Chief Complaint   Patient presents with    Pre-op Exam     Pt here to discuss laparoscopy, possible LSO and possible myomectomy . Consent signed. Nick Shah is a 47 y.o. female who presents today for evaluation of pelvic mass. Intermittent sharp pain in lower pelvis, rare pain. Past Medical History:   Diagnosis Date    Abnormal Pap smear of cervix     mild dysplasia, cryo 2017     Adnexal mass     Arthritis     back    COPD (chronic obstructive pulmonary disease) (HCC)     Fibromyalgia     Graves disease     Hypertension        Past Surgical History:   Procedure Laterality Date    COLONOSCOPY      GYNECOLOGIC CRYOSURGERY      THYROIDECTOMY      TUBAL LIGATION         Social History     Tobacco Use    Smoking status: Every Day     Packs/day: 1.00     Types: Cigarettes     Last attempt to quit: 2018     Years since quittin.6    Smokeless tobacco: Never   Substance Use Topics    Alcohol use: Yes     Comment: occas       No family history on file. Current Outpatient Medications   Medication Sig Dispense Refill    EUTHYROX 112 MCG tablet       topiramate (TOPAMAX) 25 MG tablet TAKE 1 TABLET BY MOUTH TWICE DAILY      estradiol (ESTRACE) 1 MG tablet Take 1 tablet by mouth daily 90 tablet 3    progesterone (PROMETRIUM) 200 MG CAPS capsule TAKE 1 CAPSULE BY MOUTH ONCE DAILY 90 capsule 3    fluticasone (FLOVENT HFA) 110 MCG/ACT inhaler Inhale 2 puffs into the lungs 2 times daily 1 each 5    albuterol sulfate HFA (VENTOLIN HFA) 108 (90 Base) MCG/ACT inhaler Inhale 2 puffs into the lungs in the morning and 2 puffs at noon and 2 puffs in the evening and 2 puffs before bedtime.  18 g 5    FLOVENT  MCG/ACT inhaler Inhale 2 puffs by mouth twice daily 12 g 5    losartan (COZAAR) 50 MG tablet Take 50 mg by mouth daily      DULoxetine (CYMBALTA) 30 MG extended release capsule Take 30 mg by mouth daily      fluticasone (FLOVENT HFA) 110 MCG/ACT inhaler Inhale 2 puffs into the lungs 2 times daily 1 Inhaler 5    cyclobenzaprine (FLEXERIL) 5 MG tablet Take 5 mg by mouth 3 times daily as needed for Muscle spasms      ibuprofen (ADVIL;MOTRIN) 800 MG tablet Take 800 mg by mouth every 6 hours as needed for Pain      albuterol sulfate HFA (VENTOLIN HFA) 108 (90 Base) MCG/ACT inhaler Inhale 2 puffs into the lungs every 6 hours as needed for Wheezing        No current facility-administered medications for this visit. No Known Allergies    No obstetric history on file. There is no immunization history on file for this patient. Review of Systems    /84   Ht 5' 9\" (1.753 m)   Wt 148 lb (67.1 kg)   LMP 04/25/2017   BMI 21.86 kg/m²     Physical Exam  Component Ref Range & Units 10/25/22 1125     0.0 - 35.0 U/mL 14.0      No results found for this visit on 11/22/22. See ct 10/16  See us 11/13  ASSESSMENT AND PLAN   Diagnosis Orders   1. Adnexal mass          Discussed surgical evaluation versus monitoring the mass  Desires surgical evaluation and removal  Discussed procedure and risk  Return in about 3 weeks (around 12/13/2022).     George Ni MD

## 2022-11-23 NOTE — PROGRESS NOTES
.Surgery @ [facility] on 11/30/22 you will be called 11/29/22 with times               1. Do not eat or drink anything after midnight - unless instructed by your doctor prior to surgery. This includes                   no water, chewing gum or mints. 2. Follow your directions as prescribed by the doctor for your procedure and medications. Take Euthyrox morning of surgery with sips of water. 3. Check with your Doctor regarding stopping vitamins, supplements, blood thinners and follow their instructions. Stop vitamins, supplements and NSAIDS: 11/23/22   4. Do not smoke, vape or use chewing tobacco morning of surgery. Do not drink any alcoholic beverages 24 hours prior to surgery. This includes NA Beer. No street drugs 7 days prior to surgery. No marijuana 48 hours prior to surgery. 5. You may brush your teeth and gargle the morning of surgery. DO NOT SWALLOW WATER   6. You MUST make arrangements for a responsible adult to take you home after your surgery and be able to check on you every couple                   hours for the day. You will not be allowed to leave alone or drive yourself home. It is strongly suggested someone stay with you the first 24                   hrs. Your surgery will be cancelled if you do not have a ride home. 7. Please wear simple, loose fitting clothing to the hospital.  [place] Ryaner not bring valuables (money, credit cards, checkbooks, etc.) Do not wear any                   makeup (including no eye makeup) or nail polish on your fingers or toes. 8. DO NOT wear any jewelry or piercings on day of surgery. All body piercing jewelry must be removed. 9. If you have dentures, they will be removed before going to the OR; we will provide you a container. If you wear contact lenses or glasses,                  they will be removed; please bring a case for them.            10. If you  have a Living Will and Durable Power of  for Healthcare, please bring in a copy. 11. Please bring picture ID,  insurance card, paperwork from the doctors office    (H & P, Consent, & card for implantable devices). 12. Take a shower the morning of your procedure with Hibiclens or an anti-bacterial soap. Do not apply any make-up, deodorant, lotion, oil or powder. 15.  Enter thru the main entrance on the day of surgery.

## 2022-11-29 ENCOUNTER — TELEPHONE (OUTPATIENT)
Dept: OBGYN | Age: 52
End: 2022-11-29

## 2022-11-29 ENCOUNTER — ANESTHESIA EVENT (OUTPATIENT)
Dept: OPERATING ROOM | Age: 52
End: 2022-11-29
Payer: COMMERCIAL

## 2022-11-29 NOTE — PROGRESS NOTES
11/29/22 -  for patient -  surgery @ [facility] on 11/30/22 @ [phone], arrival 0945 Please call with any questions.

## 2022-11-29 NOTE — ANESTHESIA PRE PROCEDURE
Department of Anesthesiology  Preprocedure Note       Name:  Saroj Barber   Age:  47 y.o.  :  1970                                          MRN:  [id number]         Date:  2022      Surgeon: Bhupendra Ordoñez):  Katy Sylvester MD    Procedure: Procedure(s):  LAPAROSCOPY EXPLORATORY POSSIBLE LEFT SALPINGO-OOPHORECTOMY, POSSIBLE MYOMECTOMY    Medications prior to admission:   Prior to Admission medications    Medication Sig Start Date End Date Taking? Authorizing Provider   EUTHYROX 112 MCG tablet  22   Historical Provider, MD   topiramate (TOPAMAX) 25 MG tablet TAKE 1 TABLET BY MOUTH TWICE DAILY 22   Historical Provider, MD   estradiol (ESTRACE) 1 MG tablet Take 1 tablet by mouth daily 22   Katy Sylvester MD   progesterone (PROMETRIUM) 200 MG CAPS capsule TAKE 1 CAPSULE BY MOUTH ONCE DAILY 9/27/22 10/28/22  Eliot Hernandez MD   albuterol sulfate HFA (VENTOLIN HFA) 108 (90 Base) MCG/ACT inhaler Inhale 2 puffs into the lungs in the morning and 2 puffs at noon and 2 puffs in the evening and 2 puffs before bedtime. 22   Lawrence Villarreal MD   losartan (COZAAR) 50 MG tablet Take 50 mg by mouth daily    Historical Provider, MD   DULoxetine (CYMBALTA) 30 MG extended release capsule Take 30 mg by mouth daily 21   Historical Provider, MD   fluticasone (FLOVENT HFA) 110 MCG/ACT inhaler Inhale 2 puffs into the lungs 2 times daily 18   Lawrence Villarreal MD   cyclobenzaprine (FLEXERIL) 5 MG tablet Take 5 mg by mouth 3 times daily as needed for Muscle spasms    Historical Provider, MD   ibuprofen (ADVIL;MOTRIN) 800 MG tablet Take 800 mg by mouth every 6 hours as needed for Pain    Historical Provider, MD       Current medications:    No current facility-administered medications for this encounter.      Current Outpatient Medications   Medication Sig Dispense Refill    EUTHYROX 112 MCG tablet       topiramate (TOPAMAX) 25 MG tablet TAKE 1 TABLET BY MOUTH TWICE DAILY      estradiol (ESTRACE) 1 MG tablet Take 1 tablet by mouth daily 90 tablet 3    progesterone (PROMETRIUM) 200 MG CAPS capsule TAKE 1 CAPSULE BY MOUTH ONCE DAILY 90 capsule 3    albuterol sulfate HFA (VENTOLIN HFA) 108 (90 Base) MCG/ACT inhaler Inhale 2 puffs into the lungs in the morning and 2 puffs at noon and 2 puffs in the evening and 2 puffs before bedtime.  18 g 5    losartan (COZAAR) 50 MG tablet Take 50 mg by mouth daily      DULoxetine (CYMBALTA) 30 MG extended release capsule Take 30 mg by mouth daily      fluticasone (FLOVENT HFA) 110 MCG/ACT inhaler Inhale 2 puffs into the lungs 2 times daily 1 Inhaler 5    cyclobenzaprine (FLEXERIL) 5 MG tablet Take 5 mg by mouth 3 times daily as needed for Muscle spasms      ibuprofen (ADVIL;MOTRIN) 800 MG tablet Take 800 mg by mouth every 6 hours as needed for Pain         Allergies:  No Known Allergies    Problem List:    Patient Active Problem List   Diagnosis Code    Moderate COPD (chronic obstructive pulmonary disease) ([facility]) J44.9    Menopausal symptoms N95.1    Adnexal mass N94.89       Past Medical History:        Diagnosis Date    Abnormal Pap smear of cervix     mild dysplasia, cryo 2017     Adnexal mass     Arthritis     back    COPD [facility]chronic obstructive pulmonary disease) ([facility] [address]     Fibromyalgia     Graves disease     Hypertension        Past Surgical History:        Procedure Laterality Date    COLONOSCOPY  2000    Polypectomy, divertics    GYNECOLOGIC CRYOSURGERY  2018    THYROIDECTOMY  2018    TUBAL LIGATION  1996       Social History:    Social History     Tobacco Use    Smoking status: Every Day     Packs/day: 0.50     Years: 36.00     Pack years: 18.00     Types: Cigarettes     Start date: 12     Last attempt to quit: 2018     Years since quittin.6    Smokeless tobacco: Never   Substance Use Topics    Alcohol use: Yes     Comment: occasional                                Ready to quit: Not Answered  Counseling given: Not Answered      Vital Signs (Current):   Vitals:    11/23/22 0938   Weight: 148 lb (67.1 kg)   Height: 5' 9\" (1.753 m)                                              BP Readings from Last 3 Encounters:   11/22/22 121/84   11/03/22 (!) 147/92   10/25/22 129/86       NPO Status:                                                                                 BMI:   Wt Readings from Last 3 Encounters:   11/22/22 148 lb (67.1 kg)   11/03/22 147 lb (66.7 kg)   10/25/22 146 lb (66.2 kg)     Body mass index is 21.86 kg/m². CBC:   Lab Results   Component Value Date/Time    WBC 8.7 09/30/2022 09:59 AM    RBC 4.43 09/30/2022 09:59 AM    HGB 14.1 09/30/2022 09:59 AM    HCT 42.6 09/30/2022 09:59 AM    MCV 96.0 09/30/2022 09:59 AM    RDW 13.3 09/30/2022 09:59 AM     09/30/2022 09:59 AM       CMP:   Lab Results   Component Value Date/Time     09/30/2022 09:59 AM    K 4.4 09/30/2022 09:59 AM     09/30/2022 09:59 AM    CO2 22 09/30/2022 09:59 AM    BUN 19 09/30/2022 09:59 AM    CREATININE 0.9 09/30/2022 09:59 AM    GFRAA >60 09/30/2022 09:59 AM    AGRATIO 2.5 09/30/2022 09:59 AM    LABGLOM >60 09/30/2022 09:59 AM    GLUCOSE 95 09/30/2022 09:59 AM    PROT 6.7 09/30/2022 09:59 AM    CALCIUM 9.8 09/30/2022 09:59 AM    BILITOT 0.4 09/30/2022 09:59 AM    ALKPHOS 72 09/30/2022 09:59 AM    AST 14 09/30/2022 09:59 AM    ALT 12 09/30/2022 09:59 AM       POC Tests: No results for input(s): POCGLU, POCNA, POCK, POCCL, POCBUN, POCHEMO, POCHCT in the last 72 hours.     Coags: No results found for: PROTIME, INR, APTT    HCG (If Applicable): No results found for: PREGTESTUR, PREGSERUM, HCG, HCGQUANT     ABGs: No results found for: PHART, PO2ART, QZQ1HWV, STT7AFP, BEART, R2JCOUAM     Type & Screen (If Applicable):  No results found for: LABABO, LABRH    Drug/Infectious Status (If Applicable):  No results found for: HIV, HEPCAB    COVID-19 Screening (If Applicable): No results found for: COVID19        Anesthesia Evaluation   no history of anesthetic complications:   Airway: Mallampati: I  TM distance: >3 FB   Neck ROM: full  Mouth opening: > = 3 FB   Dental:          Pulmonary:normal exam    (+) COPD:                             Cardiovascular:  Exercise tolerance: good (>4 METS),   (+) hypertension:,          Beta Blocker:  Not on Beta Blocker         Neuro/Psych:   (+) neuromuscular disease:,             GI/Hepatic/Renal: Neg GI/Hepatic/Renal ROS            Endo/Other: Negative Endo/Other ROS                     ROS comment: Graves disease Abdominal:             Vascular: Other Findings:           Anesthesia Plan      general     ASA 2     (Chart review only )  Induction: intravenous. Anesthetic plan and risks discussed with patient and spouse. Plan discussed with CRNA.                     NEGRA Coley - BIBIANA   11/29/2022

## 2022-11-29 NOTE — TELEPHONE ENCOUNTER
Spoke with patient she stated no fever just a little congestion but would like to keep surgery for tomorrow, she already has time off work.  Surgery to continue as scheduled 11/30/22

## 2022-11-29 NOTE — TELEPHONE ENCOUNTER
Pt called and states she is sched for laparoscopy at 9:45 11/30/22. Pt states she is not feeling well ss of congestion and cough with fever. What do you recommend for tomorrow?

## 2022-11-30 ENCOUNTER — HOSPITAL ENCOUNTER (OUTPATIENT)
Age: 52
Setting detail: OUTPATIENT SURGERY
Discharge: HOME | End: 2022-11-30
Attending: OBSTETRICS & GYNECOLOGY | Admitting: OBSTETRICS & GYNECOLOGY
Payer: COMMERCIAL

## 2022-11-30 ENCOUNTER — ANESTHESIA (OUTPATIENT)
Dept: OPERATING ROOM | Age: 52
End: 2022-11-30
Payer: COMMERCIAL

## 2022-11-30 VITALS
DIASTOLIC BLOOD PRESSURE: 73 MMHG | RESPIRATION RATE: 18 BRPM | HEIGHT: 69 IN | BODY MASS INDEX: 21.92 KG/M2 | OXYGEN SATURATION: 97 % | HEART RATE: 70 BPM | SYSTOLIC BLOOD PRESSURE: 123 MMHG | WEIGHT: 148 LBS | TEMPERATURE: 97.3 F

## 2022-11-30 DIAGNOSIS — N94.89 ADNEXAL MASS: ICD-10-CM

## 2022-11-30 DIAGNOSIS — G89.18 POST-OP PAIN: Primary | ICD-10-CM

## 2022-11-30 LAB
BASOPHILS ABSOLUTE: 0.1 K/CU MM
BASOPHILS RELATIVE PERCENT: 1.5 % (ref 0–1)
DIFFERENTIAL TYPE: ABNORMAL
EOSINOPHILS ABSOLUTE: 0.2 K/CU MM
EOSINOPHILS RELATIVE PERCENT: 2.5 % (ref 0–3)
HCT VFR BLD CALC: 40.5 % (ref 37–47)
HEMOGLOBIN: 13.4 GM/DL (ref 12.5–16)
IMMATURE NEUTROPHIL %: 0.3 % (ref 0–0.43)
LYMPHOCYTES ABSOLUTE: 1.5 K/CU MM
LYMPHOCYTES RELATIVE PERCENT: 19 % (ref 24–44)
MCH RBC QN AUTO: 30.7 PG (ref 27–31)
MCHC RBC AUTO-ENTMCNC: 33.1 % (ref 32–36)
MCV RBC AUTO: 92.9 FL (ref 78–100)
MONOCYTES ABSOLUTE: 0.8 K/CU MM
MONOCYTES RELATIVE PERCENT: 10.6 % (ref 0–4)
NUCLEATED RBC %: 0 %
PDW BLD-RTO: 12 % (ref 11.7–14.9)
PLATELET # BLD: 294 K/CU MM (ref 140–440)
PMV BLD AUTO: 8.9 FL (ref 7.5–11.1)
RBC # BLD: 4.36 M/CU MM (ref 4.2–5.4)
SEGMENTED NEUTROPHILS ABSOLUTE COUNT: 5.3 K/CU MM
SEGMENTED NEUTROPHILS RELATIVE PERCENT: 66.1 % (ref 36–66)
TOTAL IMMATURE NEUTOROPHIL: 0.02 K/CU MM
TOTAL NUCLEATED RBC: 0 K/CU MM
WBC # BLD: 7.9 K/CU MM (ref 4–10.5)

## 2022-11-30 PROCEDURE — 3600000014 HC SURGERY LEVEL 4 ADDTL 15MIN: Performed by: OBSTETRICS & GYNECOLOGY

## 2022-11-30 PROCEDURE — 58661 LAPAROSCOPY REMOVE ADNEXA: CPT | Performed by: OBSTETRICS & GYNECOLOGY

## 2022-11-30 PROCEDURE — 88307 TISSUE EXAM BY PATHOLOGIST: CPT

## 2022-11-30 PROCEDURE — 3700000001 HC ADD 15 MINUTES (ANESTHESIA): Performed by: OBSTETRICS & GYNECOLOGY

## 2022-11-30 PROCEDURE — 7100000001 HC PACU RECOVERY - ADDTL 15 MIN: Performed by: OBSTETRICS & GYNECOLOGY

## 2022-11-30 PROCEDURE — 7100000011 HC PHASE II RECOVERY - ADDTL 15 MIN: Performed by: OBSTETRICS & GYNECOLOGY

## 2022-11-30 PROCEDURE — 6360000002 HC RX W HCPCS: Performed by: ANESTHESIOLOGY

## 2022-11-30 PROCEDURE — 2500000003 HC RX 250 WO HCPCS: Performed by: OBSTETRICS & GYNECOLOGY

## 2022-11-30 PROCEDURE — 7100000000 HC PACU RECOVERY - FIRST 15 MIN: Performed by: OBSTETRICS & GYNECOLOGY

## 2022-11-30 PROCEDURE — 2709999900 HC NON-CHARGEABLE SUPPLY: Performed by: OBSTETRICS & GYNECOLOGY

## 2022-11-30 PROCEDURE — 3700000000 HC ANESTHESIA ATTENDED CARE: Performed by: OBSTETRICS & GYNECOLOGY

## 2022-11-30 PROCEDURE — 2720000010 HC SURG SUPPLY STERILE: Performed by: OBSTETRICS & GYNECOLOGY

## 2022-11-30 PROCEDURE — 2500000003 HC RX 250 WO HCPCS

## 2022-11-30 PROCEDURE — 2580000003 HC RX 258: Performed by: ANESTHESIOLOGY

## 2022-11-30 PROCEDURE — 3600000004 HC SURGERY LEVEL 4 BASE: Performed by: OBSTETRICS & GYNECOLOGY

## 2022-11-30 PROCEDURE — 7100000010 HC PHASE II RECOVERY - FIRST 15 MIN: Performed by: OBSTETRICS & GYNECOLOGY

## 2022-11-30 PROCEDURE — 6360000002 HC RX W HCPCS

## 2022-11-30 PROCEDURE — 85025 COMPLETE CBC W/AUTO DIFF WBC: CPT

## 2022-11-30 PROCEDURE — 6370000000 HC RX 637 (ALT 250 FOR IP): Performed by: OBSTETRICS & GYNECOLOGY

## 2022-11-30 RX ORDER — ONDANSETRON 2 MG/ML
INJECTION INTRAMUSCULAR; INTRAVENOUS PRN
Status: DISCONTINUED | OUTPATIENT
Start: 2022-11-30 | End: 2022-11-30 | Stop reason: SDUPTHER

## 2022-11-30 RX ORDER — PROPOFOL 10 MG/ML
INJECTION, EMULSION INTRAVENOUS PRN
Status: DISCONTINUED | OUTPATIENT
Start: 2022-11-30 | End: 2022-11-30 | Stop reason: SDUPTHER

## 2022-11-30 RX ORDER — SODIUM CHLORIDE 9 MG/ML
25 INJECTION, SOLUTION INTRAVENOUS PRN
Status: DISCONTINUED | OUTPATIENT
Start: 2022-11-30 | End: 2022-11-30 | Stop reason: HOSPADM

## 2022-11-30 RX ORDER — SODIUM CHLORIDE, SODIUM LACTATE, POTASSIUM CHLORIDE, CALCIUM CHLORIDE 600; 310; 30; 20 MG/100ML; MG/100ML; MG/100ML; MG/100ML
INJECTION, SOLUTION INTRAVENOUS CONTINUOUS
Status: DISCONTINUED | OUTPATIENT
Start: 2022-11-30 | End: 2022-11-30 | Stop reason: HOSPADM

## 2022-11-30 RX ORDER — FENTANYL CITRATE 50 UG/ML
25 INJECTION, SOLUTION INTRAMUSCULAR; INTRAVENOUS EVERY 5 MIN PRN
Status: DISCONTINUED | OUTPATIENT
Start: 2022-11-30 | End: 2022-11-30 | Stop reason: HOSPADM

## 2022-11-30 RX ORDER — KETOROLAC TROMETHAMINE 30 MG/ML
INJECTION, SOLUTION INTRAMUSCULAR; INTRAVENOUS PRN
Status: DISCONTINUED | OUTPATIENT
Start: 2022-11-30 | End: 2022-11-30 | Stop reason: SDUPTHER

## 2022-11-30 RX ORDER — BUPIVACAINE HYDROCHLORIDE AND EPINEPHRINE 5; 5 MG/ML; UG/ML
INJECTION, SOLUTION EPIDURAL; INTRACAUDAL; PERINEURAL
Status: COMPLETED | OUTPATIENT
Start: 2022-11-30 | End: 2022-11-30

## 2022-11-30 RX ORDER — LIDOCAINE HYDROCHLORIDE 20 MG/ML
INJECTION, SOLUTION INTRAVENOUS PRN
Status: DISCONTINUED | OUTPATIENT
Start: 2022-11-30 | End: 2022-11-30 | Stop reason: SDUPTHER

## 2022-11-30 RX ORDER — DROPERIDOL 2.5 MG/ML
0.62 INJECTION, SOLUTION INTRAMUSCULAR; INTRAVENOUS
Status: DISCONTINUED | OUTPATIENT
Start: 2022-11-30 | End: 2022-11-30

## 2022-11-30 RX ORDER — MIDAZOLAM HYDROCHLORIDE 1 MG/ML
INJECTION INTRAMUSCULAR; INTRAVENOUS PRN
Status: DISCONTINUED | OUTPATIENT
Start: 2022-11-30 | End: 2022-11-30 | Stop reason: SDUPTHER

## 2022-11-30 RX ORDER — ACETAMINOPHEN 500 MG
1000 TABLET ORAL ONCE
Status: COMPLETED | OUTPATIENT
Start: 2022-11-30 | End: 2022-11-30

## 2022-11-30 RX ORDER — OXYCODONE HYDROCHLORIDE 5 MG/1
10 TABLET ORAL PRN
Status: DISCONTINUED | OUTPATIENT
Start: 2022-11-30 | End: 2022-11-30 | Stop reason: HOSPADM

## 2022-11-30 RX ORDER — FENTANYL CITRATE 50 UG/ML
INJECTION, SOLUTION INTRAMUSCULAR; INTRAVENOUS PRN
Status: DISCONTINUED | OUTPATIENT
Start: 2022-11-30 | End: 2022-11-30 | Stop reason: SDUPTHER

## 2022-11-30 RX ORDER — IBUPROFEN 800 MG/1
800 TABLET ORAL EVERY 8 HOURS PRN
Qty: 60 TABLET | Refills: 0 | Status: SHIPPED | OUTPATIENT
Start: 2022-11-30

## 2022-11-30 RX ORDER — SODIUM CHLORIDE 0.9 % (FLUSH) 0.9 %
5-40 SYRINGE (ML) INJECTION PRN
Status: DISCONTINUED | OUTPATIENT
Start: 2022-11-30 | End: 2022-11-30 | Stop reason: HOSPADM

## 2022-11-30 RX ORDER — IPRATROPIUM BROMIDE AND ALBUTEROL SULFATE 2.5; .5 MG/3ML; MG/3ML
1 SOLUTION RESPIRATORY (INHALATION)
Status: DISCONTINUED | OUTPATIENT
Start: 2022-11-30 | End: 2022-11-30 | Stop reason: HOSPADM

## 2022-11-30 RX ORDER — HYDRALAZINE HYDROCHLORIDE 20 MG/ML
10 INJECTION INTRAMUSCULAR; INTRAVENOUS
Status: DISCONTINUED | OUTPATIENT
Start: 2022-11-30 | End: 2022-11-30 | Stop reason: HOSPADM

## 2022-11-30 RX ORDER — OXYCODONE HYDROCHLORIDE 5 MG/1
5 TABLET ORAL PRN
Status: DISCONTINUED | OUTPATIENT
Start: 2022-11-30 | End: 2022-11-30 | Stop reason: HOSPADM

## 2022-11-30 RX ORDER — CELECOXIB 200 MG/1
200 CAPSULE ORAL ONCE
Status: COMPLETED | OUTPATIENT
Start: 2022-11-30 | End: 2022-11-30

## 2022-11-30 RX ORDER — ONDANSETRON 2 MG/ML
4 INJECTION INTRAMUSCULAR; INTRAVENOUS
Status: DISCONTINUED | OUTPATIENT
Start: 2022-11-30 | End: 2022-11-30 | Stop reason: HOSPADM

## 2022-11-30 RX ORDER — LABETALOL HYDROCHLORIDE 5 MG/ML
10 INJECTION, SOLUTION INTRAVENOUS
Status: DISCONTINUED | OUTPATIENT
Start: 2022-11-30 | End: 2022-11-30 | Stop reason: HOSPADM

## 2022-11-30 RX ORDER — SODIUM CHLORIDE 0.9 % (FLUSH) 0.9 %
5-40 SYRINGE (ML) INJECTION EVERY 12 HOURS SCHEDULED
Status: DISCONTINUED | OUTPATIENT
Start: 2022-11-30 | End: 2022-11-30 | Stop reason: HOSPADM

## 2022-11-30 RX ORDER — PROMETHAZINE HYDROCHLORIDE 25 MG/ML
6.25 INJECTION, SOLUTION INTRAMUSCULAR; INTRAVENOUS EVERY 6 HOURS PRN
Status: CANCELLED | OUTPATIENT
Start: 2022-11-30

## 2022-11-30 RX ORDER — ROCURONIUM BROMIDE 10 MG/ML
INJECTION, SOLUTION INTRAVENOUS PRN
Status: DISCONTINUED | OUTPATIENT
Start: 2022-11-30 | End: 2022-11-30 | Stop reason: SDUPTHER

## 2022-11-30 RX ORDER — DEXAMETHASONE SODIUM PHOSPHATE 4 MG/ML
INJECTION, SOLUTION INTRA-ARTICULAR; INTRALESIONAL; INTRAMUSCULAR; INTRAVENOUS; SOFT TISSUE PRN
Status: DISCONTINUED | OUTPATIENT
Start: 2022-11-30 | End: 2022-11-30 | Stop reason: SDUPTHER

## 2022-11-30 RX ORDER — HYDROCODONE BITARTRATE AND ACETAMINOPHEN 5; 325 MG/1; MG/1
1 TABLET ORAL EVERY 6 HOURS PRN
Qty: 20 TABLET | Refills: 0 | Status: SHIPPED | OUTPATIENT
Start: 2022-11-30 | End: 2022-12-05

## 2022-11-30 RX ADMIN — ACETAMINOPHEN 1000 MG: 500 TABLET ORAL at 09:30

## 2022-11-30 RX ADMIN — ROCURONIUM BROMIDE 40 MG: 10 INJECTION, SOLUTION INTRAVENOUS at 12:12

## 2022-11-30 RX ADMIN — FENTANYL CITRATE 50 MCG: 50 INJECTION, SOLUTION INTRAMUSCULAR; INTRAVENOUS at 12:44

## 2022-11-30 RX ADMIN — FENTANYL CITRATE 50 MCG: 50 INJECTION, SOLUTION INTRAMUSCULAR; INTRAVENOUS at 12:08

## 2022-11-30 RX ADMIN — CELECOXIB 200 MG: 200 CAPSULE ORAL at 09:30

## 2022-11-30 RX ADMIN — SUGAMMADEX 150 MG: 100 INJECTION, SOLUTION INTRAVENOUS at 12:55

## 2022-11-30 RX ADMIN — FENTANYL CITRATE 25 MCG: 50 INJECTION INTRAMUSCULAR; INTRAVENOUS at 13:19

## 2022-11-30 RX ADMIN — HYDROMORPHONE HYDROCHLORIDE 0.5 MG: 1 INJECTION, SOLUTION INTRAMUSCULAR; INTRAVENOUS; SUBCUTANEOUS at 12:16

## 2022-11-30 RX ADMIN — SODIUM CHLORIDE, POTASSIUM CHLORIDE, SODIUM LACTATE AND CALCIUM CHLORIDE: 600; 310; 30; 20 INJECTION, SOLUTION INTRAVENOUS at 10:01

## 2022-11-30 RX ADMIN — DEXAMETHASONE SODIUM PHOSPHATE 4 MG: 4 INJECTION, SOLUTION INTRAMUSCULAR; INTRAVENOUS at 12:17

## 2022-11-30 RX ADMIN — PROPOFOL 150 MG: 10 INJECTION, EMULSION INTRAVENOUS at 12:10

## 2022-11-30 RX ADMIN — KETOROLAC TROMETHAMINE 30 MG: 30 INJECTION, SOLUTION INTRAMUSCULAR; INTRAVENOUS at 12:48

## 2022-11-30 RX ADMIN — MIDAZOLAM 2 MG: 1 INJECTION INTRAMUSCULAR; INTRAVENOUS at 12:03

## 2022-11-30 RX ADMIN — LIDOCAINE HYDROCHLORIDE 100 MG: 20 INJECTION, SOLUTION INTRAVENOUS at 12:10

## 2022-11-30 RX ADMIN — ONDANSETRON 4 MG: 2 INJECTION INTRAMUSCULAR; INTRAVENOUS at 12:45

## 2022-11-30 ASSESSMENT — PAIN DESCRIPTION - LOCATION
LOCATION: ABDOMEN

## 2022-11-30 ASSESSMENT — PAIN SCALES - GENERAL
PAINLEVEL_OUTOF10: 4
PAINLEVEL_OUTOF10: 0
PAINLEVEL_OUTOF10: 6
PAINLEVEL_OUTOF10: 4
PAINLEVEL_OUTOF10: 4

## 2022-11-30 ASSESSMENT — PAIN DESCRIPTION - DESCRIPTORS: DESCRIPTORS: ACHING

## 2022-11-30 ASSESSMENT — PAIN DESCRIPTION - FREQUENCY: FREQUENCY: CONTINUOUS

## 2022-11-30 ASSESSMENT — PAIN DESCRIPTION - ONSET: ONSET: ON-GOING

## 2022-11-30 ASSESSMENT — PAIN - FUNCTIONAL ASSESSMENT
PAIN_FUNCTIONAL_ASSESSMENT: ACTIVITIES ARE NOT PREVENTED
PAIN_FUNCTIONAL_ASSESSMENT: ACTIVITIES ARE NOT PREVENTED
PAIN_FUNCTIONAL_ASSESSMENT: 0-10

## 2022-11-30 ASSESSMENT — PAIN DESCRIPTION - PAIN TYPE
TYPE: SURGICAL PAIN
TYPE: SURGICAL PAIN;CHRONIC PAIN

## 2022-11-30 ASSESSMENT — PAIN DESCRIPTION - ORIENTATION: ORIENTATION: LEFT

## 2022-11-30 NOTE — DISCHARGE INSTRUCTIONS
Alison Das MD/Curtis Rozetta Goldmann, MD   [address], [address] [place] [address]   Phone [phone]   Fax [phone]     Exploratory Laparotomy    POST-OPERATIVE INSTRUCTIONS        DANGER SIGNS: Call OB/GYN if they occur:     A fever of more than 100.4   Report excessive bleeding (saturating a pad every ½ to 1 hour). Severe pain, unrelieved by normal medication. Shortness of breath, difficulty breathing or chest pain. Painful or burning urination. Severe back pain. Painful swelling or redness in legs. Drainage or pus from the wound. Foul smelling vaginal discharge. Eating and drinking: You may continue your regular diet, however, we recommend that you:    Eat multiple small meals. Eat foods high in protein such as meat, fish, eggs, and dairy products. Avoid hot, spicy, and fatty foods. Eat fruits with vitamin C (i.e. citrus fruits), vegetables, and high fiber foods. Drink at least 3 quarts of liquid a day (try to drink more earlier in the day). Constipation:     Narcotic pain medications such as Percocet, Vicodin, and codeine can cause constipation. Try the following to avoid constipation:   Eat high fiber foods such as prunes or adda fiber supplement such as Metamucil or Citrucel. Milk of Magnesia. Stool softener - look for Docusate sodium on the label. Activity:     Avoid strenuous physical activity. Do not lift anything greater than 10lbs until approved by your surgeon. This includes heavy housework (i.e. vacuum cleaning). Try to limit going up or down stairs to twice a day for the first week or two. Avoid tub baths for two weeks. Do not drive while taking narcotic pain medications such as Percocet, Vicodin, or Tylenol with codeine. They may make you drowsy. Avoid putting anything into your vagina (tampons, douching, and sexual intercourse) until your doctor says these activities are OK; usually 6-8 weeks.    If you exercise regularly, you should not restart until the doctor tells you that it is OK to do so. It is OK to walk as your body tolerates. Medication:     You may resume all your usual medications after surgery, unless told otherwise. While you will be sent home with strong pain medications containing small amounts of narcotics, you may also take milder medications such as Naprosyn (Aleve), Acetaminophen (Tylenol), or Ibuprofen (Motrin) for your pain. At first, take the narcotic pain medicines during the day, and use the milder medications to take the edge off in between. Take your pain medication when you first begin to feel discomfort. Do not wait until your pain is intense to take your medications. The narcotic pain medications may cause nausea or constipation. As you heal, you may find that you feel better without those medications. If Acetaminophen (Tylenol) or Ibuprofen (Motrin) relieves the pain, use these medications instead. Incision:     Abdominal incision:   You may get the incision wet in the shower but avoid tub baths for two weeks. If the incision appears dirty or caked, you may clean it using hydrogen peroxide or a cotton swab. If you have small plastic bandages called steri-strips on the incision, they may gradually fall off in the shower. You may trim the curled edges with scissors. They should fall off within one to two weeks; if not, you may gently pull them off. Vaginal Bleeding: It is normal to experience vaginal spotting and light discharge for up to a month after surgery, whether incision was abdominal or vaginal.     Bladder Catheter: If you are discharged from the hospital with a catheter, you should return to the physicians office as instructed by your surgeon for catheter removal.     Return Visit: You will need to return to see your surgeon two weeks after the date of your surgery.  At that time, your incision will be checked and any questions you may have will be answered (i.e. what more you can do physically; such as driving a car, exercise). Returning to work: This depends on the type of surgery you had and how quickly you recover. Your doctor will determine when its appropriate for you to return to work. Leave surgical dressing/bandages on for 24 hours. You may shower or bathe in the morning. You may experience some shoulder pain (especially on the right) and chest pain. This is normal and caused by the gas injected into the abdomen. The gas is inserted to create a working and viewing space inside the abdomen during surgery. No intercourse, douching or tampons for 7 days. Expect some vaginal bleeding   Take pain medication as directed. You may resume your normal daily activities as tolerated. Resume your normal diet. Try to avoid constipation by eating high fiber foods or adding a fiber supplement such as Metamucil, Fibercon, or Benefiber; especially while taking a narcotic pain medication. Advance Care Planning  People with COVID-19 may have no symptoms, mild symptoms, such as fever, cough, and shortness of breath or they may have more severe illness, developing severe and fatal pneumonia. As a result, Advance Care Planning with attention to naming a health care decision maker (someone you trust to make healthcare decisions for you if you could not speak for yourself) and sharing other health care preferences is important BEFORE a possible health crisis. Please contact your Primary Care Provider to discuss Advance Care Planning.     Preventing the Spread of Coronavirus Disease 2019 in Homes and Residential Communities  For the most recent information go to [employer]    Prevention steps for People with confirmed or suspected COVID-19 (including persons under investigation) who do not need to be hospitalized  and   People with confirmed COVID-19 who were hospitalized and determined to be medically stable to go home    Your healthcare provider and public health staff will evaluate whether you can be cared for at home. If it is determined that you do not need to be hospitalized and can be isolated at home, you will be monitored by staff from your local or state health department. You should follow the prevention steps below until a healthcare provider or local or state health department says you can return to your normal activities. Stay home except to get medical care  People who are mildly ill with COVID-19 are able to isolate at home during their illness. You should restrict activities outside your home, except for getting medical care. Do not go to work, school, or public areas. Avoid using public transportation, ride-sharing, or taxis. Separate yourself from other people and animals in your home  People: As much as possible, you should stay in a specific room and away from other people in your home. Also, you should use a separate bathroom, if available. Animals: You should restrict contact with pets and other animals while you are sick with COVID-19, just like you would around other people. Although there have not been reports of pets or other animals becoming sick with COVID-19, it is still recommended that people sick with COVID-19 limit contact with animals until more information is known about the virus. When possible, have another member of your household care for your animals while you are sick. If you are sick with COVID-19, avoid contact with your pet, including petting, snuggling, being kissed or licked, and sharing food. If you must care for your pet or be around animals while you are sick, wash your hands before and after you interact with pets and wear a facemask. Call ahead before visiting your doctor  If you have a medical appointment, call the healthcare provider and tell them that you have or may have COVID-19.  This will help the healthcare providers office take steps to keep other people from getting infected or exposed. Wear a facemask  You should wear a facemask when you are around other people (e.g., sharing a room or vehicle) or pets and before you enter a healthcare providers office. If you are not able to wear a facemask (for example, because it causes trouble breathing), then people who live with you should not stay in the same room with you, or they should wear a facemask if they enter your room. Cover your coughs and sneezes  Cover your mouth and nose with a tissue when you cough or sneeze. Throw used tissues in a lined trash can. Immediately wash your hands with soap and water for at least 20 seconds or, if soap and water are not available, clean your hands with an alcohol-based hand  that contains at least 60% alcohol. Clean your hands often  Wash your hands often with soap and water for at least 20 seconds, especially after blowing your nose, coughing, or sneezing; going to the bathroom; and before eating or preparing food. If soap and water are not readily available, use an alcohol-based hand  with at least 60% alcohol, covering all surfaces of your hands and rubbing them together until they feel dry. Soap and water are the best option if hands are visibly dirty. Avoid touching your eyes, nose, and mouth with unwashed hands. Avoid sharing personal household items  You should not share dishes, drinking glasses, cups, eating utensils, towels, or bedding with other people or pets in your home. After using these items, they should be washed thoroughly with soap and water. Clean all high-touch surfaces everyday  High touch surfaces include counters, tabletops, doorknobs, bathroom fixtures, toilets, phones, keyboards, tablets, and bedside tables. Also, clean any surfaces that may have blood, stool, or body fluids on them. Use a household cleaning spray or wipe, according to the label instructions.  Labels contain instructions for safe and effective use of the cleaning product including precautions you should take when applying the product, such as wearing gloves and making sure you have good ventilation during use of the product. Monitor your symptoms  Seek prompt medical attention if your illness is worsening (e.g., difficulty breathing). Before seeking care, call your healthcare provider and tell them that you have, or are being evaluated for, COVID-19. Put on a facemask before you enter the facility. These steps will help the healthcare providers office to keep other people in the office or waiting room from getting infected or exposed. Ask your healthcare provider to call the local or state health department. Persons who are placed under active monitoring or facilitated self-monitoring should follow instructions provided by their local health department or occupational health professionals, as appropriate. When working with your local health department check their available hours. If you have a medical emergency and need to call 911, notify the dispatch personnel that you have, or are being evaluated for COVID-19. If possible, put on a facemask before emergency medical services arrive. Discontinuing home isolation  Patients with confirmed COVID-19 should remain under home isolation precautions until the risk of secondary transmission to others is thought to be low. The decision to discontinue home isolation precautions should be made on a case-by-case basis, in consultation with healthcare providers and state and local health departments.

## 2022-11-30 NOTE — PROGRESS NOTES
1342 Pt received from [facility] and report received from [address] [place] [address]. Pt denies c/o. Pt given razia mist and lilian crackers. Call light in reach. 1400 In to check on pt. Pt denies c/o or needs.  at bedside. Call light in reach. 1430 Pt resting with eyes closed respirations even and unlabored.  1440 Discharge instructions given to pt and . Both verbalized understanding of instructions. 1455 Pt up to side of bed. Pt tolerated well and ready to get dressed to go home. Pt denies needs. Call light in reach. 1 Pt discharged to home to husbands awaiting vehicle to drive pt home.

## 2022-11-30 NOTE — OP NOTE
Department of Gynecology  Operative Report        Pre-operative Diagnosis: Chronic female pelvic pain, left adnexal mass    Post-operative Diagnosis: Chronic female pelvic pain, left ovarian mass    Procedure: Laparoscopy with left salpingo-oophorectomy    Surgeon: Dr. Marquis Campa     Assistant(s): None    Anesthesia:  General Endotracheal Anesthesia    Findings: Laparoscopy reveals a normal upper abdominal survey. No evidence of any masses. Normal liver edge. Omentum is normal without evidence evidence of any masses. The peritoneum is smooth and glistening throughout. The uterus is normal in appearance. The right fallopian tube is normal in appearance the right ovary is normal in appearance. The left fallopian tube including fimbriated end is normal in appearance. The left ovary has an enlarged solid yellow tumor. The posterior and anterior cul-de-sacs are normal without evidence of any masses, tumors, or excrescences. Estimated blood loss: 2 cc    Blood Transfusion?:  No     Drains: None    Specimens: Left fallopian tube and ovary    Complications: None    Condition: Stable    Narrative operative report: After informed consent was obtained, patient was brought to the operating suite where general endotracheal anesthesia was attained by the anesthesia service without complication. Patient was then prepped and draped in the normal sterile fashion after she was placed in the dorsal lithotomy position. Bladder was drained via transurethral in and out catheterization. Gloves were changed and attention was then turned to the abdomen. All incisions were infiltrated with 0.5% Marcaine with epinephrine prior to incision being made. A 5 mm vertical umbilical skin incision was made. A 5 mm trocar and sleeve were introduced into the abdomen under direct visualization. Pneumoperitoneum was obtained. A second skin incision was made 2 cm above the symphysis pubis in the midline.   This was a 5 mm incision and a 5 mm trocar and sleeve were introduced into the abdomen under direct visualization. A third incision was made in the patient's left lower quadrant. An 11 mm trocar and sleeve were introduced into the abdomen under direct visualization a diagnostic laparoscopy was performed as noted above. Using a blunt tip LigaSure device, the left infundibulopelvic ligament was grasped, sealed, cut..  The mesosalpinx was serially grasped sealed and cut. Proximal aspect of the tube was grasped sealed and cut with a blunt tip LigaSure device. The utero-ovarian ligament on the patient's left side was grasped sealed and cut. The ovary was placed in the Endobag along with the fallopian tube. This was removed via the left lower quadrant trocar site which did need to be extended. A systematic look at all surgical sites were performed and they were noted to be hemostatic. Carbon dioxide was released from abdominal cavity during removal of the ovary along with the left trocar. The midline trochars removed from the abdominal cavity. The fascia at the left lower quadrant trocar site was grasped with hemostats. The fascia was closed with 0 PDS in a continuous running fashion. Skin was closed with 4-0 Monocryl in a subcuticular fashion and a bandage was applied. Patient was placed in the supine position and awoken from anesthesia and extubated in the operating room by the anesthesia service without complication. Patient was brought to recovery room in stable condition.   There were no complications      Dexter Anthony MD 11/30/2022 12:58 PM

## 2022-11-30 NOTE — H&P
Christian Rhodes  1970  Primary Care Physician: Maria M Ayon PA-C    HISTORY & PHYSICAL        HOSPITAL: [address] [facility]    HPI: Christian Rhodes is a 47 y.o. female No obstetric history on file. is with pelvic pain who was noted to have a left adnexal mass versus a pedunculated myoma. This mass measures 6 cm is solid in nature and is in the left adnexal region. No diagnosis found. Patient Active Problem List   Diagnosis    Moderate COPD (chronic obstructive pulmonary disease) (HCC)    Menopausal symptoms    Adnexal mass       OB History   No obstetric history on file.      Past Medical History:   Diagnosis Date    Abnormal Pap smear of cervix     mild dysplasia, cryo 2017     Adnexal mass     Arthritis     back    COPD (chronic obstructive pulmonary disease) (HCC)     Fibromyalgia     Graves disease     Hypertension        Past Surgical History:   Procedure Laterality Date    COLONOSCOPY      Polypectomy, divertics    GYNECOLOGIC CRYOSURGERY      THYROIDECTOMY  2018    TUBAL LIGATION  1996       Social History     Socioeconomic History    Marital status: Single     Spouse name: Not on file    Number of children: Not on file    Years of education: Not on file    Highest education level: Not on file   Occupational History    Not on file   Tobacco Use    Smoking status: Every Day     Packs/day: 0.50     Years: 36.00     Pack years: 18.00     Types: Cigarettes     Start date: 13     Last attempt to quit: 2018     Years since quittin.6    Smokeless tobacco: Never   Vaping Use    Vaping Use: Never used   Substance and Sexual Activity    Alcohol use: Yes     Comment: occasional    Drug use: Yes     Frequency: 7.0 times per week     Types: Marijuana Shellye Burkitt)    Sexual activity: Not on file   Other Topics Concern    Not on file   Social History Narrative    Not on file     Social Determinants of Health     Financial Resource Strain: Low Risk     Difficulty of Paying Living Expenses: Not hard at all   Food Insecurity: No Food Insecurity    Worried About [facility] in the Last Year: Never true    [address] in the Last Year: Never true   Transportation Needs: No Transportation Needs    Lack of Transportation (Medical): No    Lack of Transportation (Non-Medical): No   Physical Activity: Not on file   Stress: Not on file   Social Connections: Not on file   Intimate Partner Violence: Not on file   Housing Stability: Unknown    Unable to Pay for Housing in the Last Year: No    Number of Places Lived in the Last Year: Not on file    Unstable Housing in the Last Year: No         MEDICATIONS:  No current facility-administered medications for this encounter. Current Outpatient Medications   Medication Sig Dispense Refill    EUTHYROX 112 MCG tablet       topiramate (TOPAMAX) 25 MG tablet TAKE 1 TABLET BY MOUTH TWICE DAILY      estradiol (ESTRACE) 1 MG tablet Take 1 tablet by mouth daily 90 tablet 3    progesterone (PROMETRIUM) 200 MG CAPS capsule TAKE 1 CAPSULE BY MOUTH ONCE DAILY 90 capsule 3    albuterol sulfate HFA (VENTOLIN HFA) 108 (90 Base) MCG/ACT inhaler Inhale 2 puffs into the lungs in the morning and 2 puffs at noon and 2 puffs in the evening and 2 puffs before bedtime. 18 g 5    losartan (COZAAR) 50 MG tablet Take 50 mg by mouth daily      DULoxetine (CYMBALTA) 30 MG extended release capsule Take 30 mg by mouth daily      fluticasone (FLOVENT HFA) 110 MCG/ACT inhaler Inhale 2 puffs into the lungs 2 times daily 1 Inhaler 5    cyclobenzaprine (FLEXERIL) 5 MG tablet Take 5 mg by mouth 3 times daily as needed for Muscle spasms      ibuprofen (ADVIL;MOTRIN) 800 MG tablet Take 800 mg by mouth every 6 hours as needed for Pain             ALLERGIES:  Allergies as of 11/15/2022    (No Known Allergies)         REVIEW OF SYSTEMS:  General appearance:Appears healthy. Alert; in no acute distress. Pleasant.   HEENT: Negative  CARDIOVASCULAR: Denies: chest pain, dyspnea on exertion, palpitations  RESPIRATORY: Denies: cough, shortness of breath, wheezing  GI: Denies: abdominal pain, flank pain, nausea, vomiting, diarrhea  : Denies: dysuria, frequency/urgency, hematuria. +pelvic pain  MUSCULOSKELETAL: Denies: back pain, joint swelling, muscle pain  SKIN: Denies: rash, hives. HEMATOLOGIC: Denies Bleeding Disorder, Coagulopathy or Transfusion  NEUROLOGIC: Denies Migraines, Headaches, CVA, TIA  ENDOCRINE: Denies any Endocrinologic disorders      Height 5' 9\" (1.753 m), weight 148 lb (67.1 kg), last menstrual period 09/01/2016.    11/30/22 0906 98.1 (36.7) 18 83 137/93 -- -- -- -- 97 None (Room air) -- --       General Appearance:  awake, alert, oriented, in no acute distress  Skin:  Skin color, texture, turgor normal. No rashes or lesions. Mouth/Throat:  Mucosa moist.  No lesions. Pharynx without erythema, edema or exudate. Neck:  neck- supple, no mass, non-tender  Lungs:  No chest wall tenderness. Heart:  Heart sounds are normal.  Regular rate and rhythm without murmur, gallop or rub. Heart regular rate and rhythm  Abdomen:  Soft, non-tender, normal bowel sounds. No bruits, organomegaly or masses. Extremities: Extremities warm to touch, pink, with no edema. Musculoskeletal:  negative  Peripheral Pulses:  Normal  Neurologic:  negative      Diagnostics:  US NON OB TRANSVAGINAL    Result Date: 11/13/2022  See ultrasound report. Images and report reviewed and I agree with comments and findings. No changes or additions. The right ovary is a normal sonographic appearance. The uterus is a normal sonographic appearance. There is a 5 cm solid mass within the left adnexa. A definitive left ovary is not identified. .    The full ultrasound report is located in the media tab for further details. [id number].   Left adnexal mass      Labs:  Results for orders placed or performed in visit on 10/25/22      Result Value Ref Range     14.0 0.0 - 35.0 U/mL ASSESSMENT:  Chronic female pelvic pain with a left adnexal mass    Plan:  Laparoscopy with left salpingo-oophorectomy versus a myomectomy. Counseling: The patient was counseled on all options both medical and surgical, conservative as well as definitive. She has elected to proceed with the procedure as stated above. The patient was counseled on the procedure. Risks and complications were reviewed in detail including infection, hemorrhage, blood transfusion, injury to organs such as bowel, bladder and neurovascular structures with the possibility of a prolonged postoperative course. The patients orders, labs, consents have been completed. The patient is aware that this procedure may not alleviate her symptoms. That there may be a necessity for a second surgery and that there may be an incomplete removal of abnormal tissue.

## 2022-11-30 NOTE — ANESTHESIA POSTPROCEDURE EVALUATION
Department of Anesthesiology  Postprocedure Note    Patient: Lanette Caro  MRN: [id number]  YOB: 1970  Date of evaluation: 11/30/2022      Procedure Summary     Date: 11/30/22 Room / Location: [address]    Anesthesia Start: 6672 Anesthesia Stop: 3793    Procedure: LAPAROSCOPY EXPLORATORY  WITH SALPINGO-OOPHORECTOMY (Abdomen/Perineum) Diagnosis:       Adnexal mass      (Adnexal mass [N94.89])    Surgeons: Jessica Tyler MD Responsible Provider: Bart Brian MD    Anesthesia Type: general ASA Status: 2          Anesthesia Type: No value filed.     Ghassan Phase I: Ghassan Score: 9    Ghassan Phase II:        Anesthesia Post Evaluation    Patient location during evaluation: PACU  Patient participation: complete - patient participated  Level of consciousness: awake and alert  Pain score: 1  Airway patency: patent  Nausea & Vomiting: no nausea and no vomiting  Complications: no  Cardiovascular status: hemodynamically stable  Respiratory status: acceptable, spontaneous ventilation and face mask  Hydration status: stable

## 2022-11-30 NOTE — PROGRESS NOTES
1302: Pt arrived to PACU from OR. Monitors applied, alarms on. Surgical sites dry and intact, small breakthrough drainage noted on top middle incision. Report obtained from [facility] and [employer]. 1310: Pt repositioned in bed, tolerating ice chips at this time. 1320:  Pt states pain 6/10, pt medicated per pain scale. 1339: Phase 1 care complete. Pt transferred to [facility] 20. Report given to Nikkie Ag RN bedside.

## 2022-12-13 ENCOUNTER — OFFICE VISIT (OUTPATIENT)
Dept: OBGYN | Age: 52
End: 2022-12-13

## 2022-12-13 ENCOUNTER — TELEPHONE (OUTPATIENT)
Dept: OBGYN | Age: 52
End: 2022-12-13

## 2022-12-13 VITALS
BODY MASS INDEX: 21.18 KG/M2 | HEIGHT: 69 IN | WEIGHT: 143 LBS | DIASTOLIC BLOOD PRESSURE: 85 MMHG | SYSTOLIC BLOOD PRESSURE: 126 MMHG

## 2022-12-13 DIAGNOSIS — Z09 POSTOPERATIVE EXAMINATION: Primary | ICD-10-CM

## 2022-12-13 PROCEDURE — 99024 POSTOP FOLLOW-UP VISIT: CPT | Performed by: OBSTETRICS & GYNECOLOGY

## 2022-12-13 NOTE — PROGRESS NOTES
Postop Progress Note    Subjective    Luda Vasquez presents to the office for postop follow up. Objective    Vitals:    12/13/22 1126   BP: 126/85     General: alert, cooperative and no distress  Incision: healing well    Assessment  Doing well postoperatively. Plan  1. Continue any current medications  2. Wound care discussed  3. Pt is to increase activities as tolerated  4. Usual diet  5.  Follow up: as needed    Electronically signed by Azael Gimenez MD on 12/13/2022 at 12:02 PM

## 2022-12-13 NOTE — TELEPHONE ENCOUNTER
Pt states she had her post op today and forgot to ask about returning to work with out restrictions. Please advise.

## 2023-08-30 DIAGNOSIS — N95.1 MENOPAUSAL SYMPTOMS: ICD-10-CM

## 2023-08-30 RX ORDER — PROGESTERONE 200 MG/1
CAPSULE ORAL
Qty: 90 CAPSULE | Refills: 0 | Status: SHIPPED | OUTPATIENT
Start: 2023-08-30 | End: 2023-11-02

## 2023-08-30 RX ORDER — ESTRADIOL 1 MG/1
1 TABLET ORAL DAILY
Qty: 90 TABLET | Refills: 0 | Status: SHIPPED | OUTPATIENT
Start: 2023-08-30

## 2023-08-30 NOTE — TELEPHONE ENCOUNTER
Pt requesting 1 timer refill on Estradiol and Progesterone. Pt has an scheduled 10/6/23. RX pending.

## 2023-10-26 SDOH — ECONOMIC STABILITY: FOOD INSECURITY: WITHIN THE PAST 12 MONTHS, YOU WORRIED THAT YOUR FOOD WOULD RUN OUT BEFORE YOU GOT MONEY TO BUY MORE.: PATIENT DECLINED

## 2023-10-26 SDOH — ECONOMIC STABILITY: INCOME INSECURITY: HOW HARD IS IT FOR YOU TO PAY FOR THE VERY BASICS LIKE FOOD, HOUSING, MEDICAL CARE, AND HEATING?: PATIENT DECLINED

## 2023-10-26 SDOH — ECONOMIC STABILITY: FOOD INSECURITY: WITHIN THE PAST 12 MONTHS, THE FOOD YOU BOUGHT JUST DIDN'T LAST AND YOU DIDN'T HAVE MONEY TO GET MORE.: PATIENT DECLINED

## 2023-10-27 ENCOUNTER — OFFICE VISIT (OUTPATIENT)
Dept: OBGYN | Age: 53
End: 2023-10-27
Payer: COMMERCIAL

## 2023-10-27 VITALS
DIASTOLIC BLOOD PRESSURE: 75 MMHG | BODY MASS INDEX: 22.81 KG/M2 | SYSTOLIC BLOOD PRESSURE: 160 MMHG | HEIGHT: 69 IN | WEIGHT: 154 LBS

## 2023-10-27 DIAGNOSIS — Z78.0 ENCOUNTER FOR OSTEOPOROSIS SCREENING IN ASYMPTOMATIC POSTMENOPAUSAL PATIENT: ICD-10-CM

## 2023-10-27 DIAGNOSIS — Z78.0 POSTMENOPAUSAL: ICD-10-CM

## 2023-10-27 DIAGNOSIS — Z12.31 BREAST CANCER SCREENING BY MAMMOGRAM: ICD-10-CM

## 2023-10-27 DIAGNOSIS — Z01.419 WOMEN'S ANNUAL ROUTINE GYNECOLOGICAL EXAMINATION: Primary | ICD-10-CM

## 2023-10-27 DIAGNOSIS — Z13.820 ENCOUNTER FOR OSTEOPOROSIS SCREENING IN ASYMPTOMATIC POSTMENOPAUSAL PATIENT: ICD-10-CM

## 2023-10-27 DIAGNOSIS — Z72.0 TOBACCO ABUSE: ICD-10-CM

## 2023-10-27 PROCEDURE — 99396 PREV VISIT EST AGE 40-64: CPT | Performed by: NURSE PRACTITIONER

## 2023-10-27 PROCEDURE — G8484 FLU IMMUNIZE NO ADMIN: HCPCS | Performed by: NURSE PRACTITIONER

## 2023-10-27 RX ORDER — VENLAFAXINE HYDROCHLORIDE 75 MG/1
CAPSULE, EXTENDED RELEASE ORAL
COMMUNITY
Start: 2023-08-05

## 2023-10-27 RX ORDER — MELATONIN
1 DAILY
Qty: 30 TABLET | Refills: 0 | COMMUNITY
Start: 2023-10-27 | End: 2023-10-27

## 2023-10-27 RX ORDER — PHENOL 1.4 %
2 AEROSOL, SPRAY (ML) MUCOUS MEMBRANE DAILY
Qty: 180 TABLET | Refills: 3 | Status: SHIPPED | OUTPATIENT
Start: 2023-10-27

## 2023-10-27 RX ORDER — HYDROXYZINE HYDROCHLORIDE 25 MG/1
TABLET, FILM COATED ORAL
COMMUNITY
Start: 2023-09-01

## 2023-10-27 RX ORDER — PHENOL 1.4 %
1 AEROSOL, SPRAY (ML) MUCOUS MEMBRANE DAILY
Qty: 30 TABLET | Refills: 3 | Status: SHIPPED | OUTPATIENT
Start: 2023-10-27 | End: 2023-10-27

## 2023-10-27 RX ORDER — MELATONIN
1 DAILY
Qty: 90 TABLET | Refills: 3 | Status: SHIPPED | OUTPATIENT
Start: 2023-10-27

## 2023-10-27 ASSESSMENT — PATIENT HEALTH QUESTIONNAIRE - PHQ9
2. FEELING DOWN, DEPRESSED OR HOPELESS: 0
SUM OF ALL RESPONSES TO PHQ QUESTIONS 1-9: 0
1. LITTLE INTEREST OR PLEASURE IN DOING THINGS: 0
SUM OF ALL RESPONSES TO PHQ9 QUESTIONS 1 & 2: 0
SUM OF ALL RESPONSES TO PHQ QUESTIONS 1-9: 0

## 2023-10-27 ASSESSMENT — ENCOUNTER SYMPTOMS
CONSTIPATION: 0
VOMITING: 0
DIARRHEA: 0
NAUSEA: 0
RESPIRATORY NEGATIVE: 1
SHORTNESS OF BREATH: 0
ABDOMINAL PAIN: 0
GASTROINTESTINAL NEGATIVE: 1

## 2023-10-27 NOTE — PROGRESS NOTES
Sig Dispense Refill    calcium carbonate (CALCIUM 600) 600 MG TABS tablet Take 2 tablets by mouth daily 180 tablet 3    vitamin D3 (CHOLECALCIFEROL) 25 MCG (1000 UT) TABS tablet Take 1 tablet by mouth daily 90 tablet 3    estradiol (ESTRACE) 1 MG tablet Take 1 tablet by mouth daily 90 tablet 0    progesterone (PROMETRIUM) 200 MG CAPS capsule TAKE 1 CAPSULE BY MOUTH ONCE DAILY 90 capsule 0    ibuprofen (ADVIL;MOTRIN) 800 MG tablet Take 1 tablet by mouth every 8 hours as needed for Pain 60 tablet 0    EUTHYROX 112 MCG tablet       topiramate (TOPAMAX) 25 MG tablet TAKE 1 TABLET BY MOUTH TWICE DAILY      albuterol sulfate HFA (VENTOLIN HFA) 108 (90 Base) MCG/ACT inhaler Inhale 2 puffs into the lungs in the morning and 2 puffs at noon and 2 puffs in the evening and 2 puffs before bedtime. 18 g 5    losartan (COZAAR) 50 MG tablet Take 2 tablets by mouth daily      fluticasone (FLOVENT HFA) 110 MCG/ACT inhaler Inhale 2 puffs into the lungs 2 times daily 1 Inhaler 5    cyclobenzaprine (FLEXERIL) 5 MG tablet Take 1 tablet by mouth 3 times daily as needed for Muscle spasms      hydrOXYzine HCl (ATARAX) 25 MG tablet TAKE 1 TO 2 TABLETS BY MOUTH EVERY 8 HOURS AS NEEDED FOR ANXIETY      venlafaxine (EFFEXOR XR) 75 MG extended release capsule       DULoxetine (CYMBALTA) 30 MG extended release capsule Take 30 mg by mouth daily (Patient not taking: Reported on 11/30/2022)       No current facility-administered medications for this visit. No Known Allergies    No obstetric history on file. There is no immunization history on file for this patient. Review of Systems   Constitutional: Negative. Negative for fatigue. Respiratory: Negative. Negative for shortness of breath. Gastrointestinal: Negative. Negative for abdominal pain, constipation, diarrhea, nausea and vomiting. Genitourinary: Negative. Neurological:  Negative for dizziness. Psychiatric/Behavioral: Negative.          BP (!) 160/75 (Site: Left

## 2023-12-04 ENCOUNTER — TELEPHONE (OUTPATIENT)
Dept: OBGYN | Age: 53
End: 2023-12-04

## 2023-12-04 DIAGNOSIS — N95.1 MENOPAUSAL SYMPTOMS: ICD-10-CM

## 2023-12-04 RX ORDER — ESTRADIOL 1 MG/1
1 TABLET ORAL DAILY
Qty: 90 TABLET | Refills: 3 | Status: SHIPPED | OUTPATIENT
Start: 2023-12-04

## 2023-12-04 RX ORDER — PROGESTERONE 200 MG/1
CAPSULE ORAL
Qty: 90 CAPSULE | Refills: 3 | Status: SHIPPED | OUTPATIENT
Start: 2023-12-04 | End: 2024-02-06

## 2023-12-04 NOTE — TELEPHONE ENCOUNTER
Pt states at annual it was discussed to wean off of the Estradial and Prometrium. Pt states she would like to start them again and requesting refills.

## 2024-11-26 DIAGNOSIS — N95.1 MENOPAUSAL SYMPTOMS: ICD-10-CM

## 2024-11-26 RX ORDER — PROGESTERONE 200 MG/1
CAPSULE ORAL
Qty: 90 CAPSULE | Refills: 0 | OUTPATIENT
Start: 2024-11-26

## 2024-12-02 DIAGNOSIS — N95.1 MENOPAUSAL SYMPTOMS: ICD-10-CM

## 2024-12-02 RX ORDER — PROGESTERONE 200 MG/1
CAPSULE ORAL
Qty: 90 CAPSULE | Refills: 0 | OUTPATIENT
Start: 2024-12-02

## 2024-12-05 DIAGNOSIS — N95.1 MENOPAUSAL SYMPTOMS: ICD-10-CM

## 2024-12-05 NOTE — TELEPHONE ENCOUNTER
Pt requesting refill until office visit 1/9/2024. Pt is not due for annual until 2025 due to insurance. Medication pending.

## 2024-12-06 RX ORDER — PROGESTERONE 200 MG/1
CAPSULE ORAL
Qty: 90 CAPSULE | Refills: 0 | Status: SHIPPED | OUTPATIENT
Start: 2024-12-06 | End: 2025-02-07

## 2025-02-20 DIAGNOSIS — N95.1 MENOPAUSAL SYMPTOMS: ICD-10-CM

## 2025-02-21 RX ORDER — PROGESTERONE 200 MG/1
CAPSULE ORAL
Qty: 90 CAPSULE | Refills: 0 | Status: SHIPPED | OUTPATIENT
Start: 2025-02-21 | End: 2025-04-24

## 2025-02-21 RX ORDER — ESTRADIOL 1 MG/1
1 TABLET ORAL DAILY
Qty: 90 TABLET | Refills: 0 | Status: SHIPPED | OUTPATIENT
Start: 2025-02-21

## 2025-02-25 ENCOUNTER — TELEPHONE (OUTPATIENT)
Dept: GASTROENTEROLOGY | Age: 55
End: 2025-02-25

## 2025-03-03 SDOH — ECONOMIC STABILITY: FOOD INSECURITY: WITHIN THE PAST 12 MONTHS, YOU WORRIED THAT YOUR FOOD WOULD RUN OUT BEFORE YOU GOT MONEY TO BUY MORE.: NEVER TRUE

## 2025-03-03 SDOH — ECONOMIC STABILITY: FOOD INSECURITY: WITHIN THE PAST 12 MONTHS, THE FOOD YOU BOUGHT JUST DIDN'T LAST AND YOU DIDN'T HAVE MONEY TO GET MORE.: NEVER TRUE

## 2025-03-03 SDOH — ECONOMIC STABILITY: INCOME INSECURITY: IN THE LAST 12 MONTHS, WAS THERE A TIME WHEN YOU WERE NOT ABLE TO PAY THE MORTGAGE OR RENT ON TIME?: NO

## 2025-03-04 ENCOUNTER — TELEPHONE (OUTPATIENT)
Dept: GASTROENTEROLOGY | Age: 55
End: 2025-03-04

## 2025-03-06 ENCOUNTER — HOSPITAL ENCOUNTER (OUTPATIENT)
Age: 55
Setting detail: SPECIMEN
Discharge: HOME | End: 2025-03-06
Payer: MEDICARE

## 2025-03-06 ENCOUNTER — OFFICE VISIT (OUTPATIENT)
Dept: OBGYN | Age: 55
End: 2025-03-06

## 2025-03-06 VITALS
BODY MASS INDEX: 22.69 KG/M2 | SYSTOLIC BLOOD PRESSURE: 112 MMHG | HEART RATE: 79 BPM | HEIGHT: 69 IN | WEIGHT: 153.2 LBS | DIASTOLIC BLOOD PRESSURE: 86 MMHG

## 2025-03-06 DIAGNOSIS — Z13.820 ENCOUNTER FOR OSTEOPOROSIS SCREENING IN ASYMPTOMATIC POSTMENOPAUSAL PATIENT: ICD-10-CM

## 2025-03-06 DIAGNOSIS — Z72.0 TOBACCO ABUSE: ICD-10-CM

## 2025-03-06 DIAGNOSIS — Z01.419 WOMEN'S ANNUAL ROUTINE GYNECOLOGICAL EXAMINATION: Primary | ICD-10-CM

## 2025-03-06 DIAGNOSIS — Z78.0 POSTMENOPAUSAL: ICD-10-CM

## 2025-03-06 DIAGNOSIS — N95.1 MENOPAUSAL SYMPTOMS: ICD-10-CM

## 2025-03-06 DIAGNOSIS — Z12.31 BREAST CANCER SCREENING BY MAMMOGRAM: ICD-10-CM

## 2025-03-06 DIAGNOSIS — Z12.11 COLON CANCER SCREENING: ICD-10-CM

## 2025-03-06 DIAGNOSIS — Z78.0 ENCOUNTER FOR OSTEOPOROSIS SCREENING IN ASYMPTOMATIC POSTMENOPAUSAL PATIENT: ICD-10-CM

## 2025-03-06 PROCEDURE — G0123 SCREEN CERV/VAG THIN LAYER: HCPCS

## 2025-03-06 PROCEDURE — 87624 HPV HI-RISK TYP POOLED RSLT: CPT

## 2025-03-06 RX ORDER — PROGESTERONE 200 MG/1
CAPSULE ORAL
Qty: 90 CAPSULE | Refills: 3 | Status: SHIPPED | OUTPATIENT
Start: 2025-03-06 | End: 2025-05-07

## 2025-03-06 RX ORDER — ESTRADIOL 1 MG/1
1 TABLET ORAL DAILY
Qty: 90 TABLET | Refills: 3 | Status: SHIPPED | OUTPATIENT
Start: 2025-03-06

## 2025-03-06 ASSESSMENT — ENCOUNTER SYMPTOMS
RESPIRATORY NEGATIVE: 1
ABDOMINAL PAIN: 0
NAUSEA: 0
CONSTIPATION: 0
DIARRHEA: 0
GASTROINTESTINAL NEGATIVE: 1
VOMITING: 0
SHORTNESS OF BREATH: 0

## 2025-03-06 ASSESSMENT — PATIENT HEALTH QUESTIONNAIRE - PHQ9
SUM OF ALL RESPONSES TO PHQ QUESTIONS 1-9: 0
2. FEELING DOWN, DEPRESSED OR HOPELESS: NOT AT ALL
SUM OF ALL RESPONSES TO PHQ QUESTIONS 1-9: 0
1. LITTLE INTEREST OR PLEASURE IN DOING THINGS: NOT AT ALL

## 2025-03-06 NOTE — PROGRESS NOTES
use: Yes     Comment: occasional    Drug use: Yes     Frequency: 7.0 times per week     Types: Marijuana (Weed)       Current Outpatient Medications   Medication Sig Dispense Refill    estradiol (ESTRACE) 1 MG tablet Take 1 tablet by mouth daily 90 tablet 3    progesterone (PROMETRIUM) 200 MG CAPS capsule TAKE 1 CAPSULE BY MOUTH ONCE DAILY 90 capsule 3    losartan-hydroCHLOROthiazide (HYZAAR) 100-25 MG per tablet Take 1 tablet by mouth daily      hydrOXYzine HCl (ATARAX) 25 MG tablet TAKE 1 TO 2 TABLETS BY MOUTH EVERY 8 HOURS AS NEEDED FOR ANXIETY      venlafaxine (EFFEXOR XR) 75 MG extended release capsule       ibuprofen (ADVIL;MOTRIN) 800 MG tablet Take 1 tablet by mouth every 8 hours as needed for Pain 60 tablet 0    EUTHYROX 112 MCG tablet       albuterol sulfate HFA (VENTOLIN HFA) 108 (90 Base) MCG/ACT inhaler Inhale 2 puffs into the lungs in the morning and 2 puffs at noon and 2 puffs in the evening and 2 puffs before bedtime. 18 g 5    fluticasone (FLOVENT HFA) 110 MCG/ACT inhaler Inhale 2 puffs into the lungs 2 times daily 1 Inhaler 5    cyclobenzaprine (FLEXERIL) 5 MG tablet Take 1 tablet by mouth 3 times daily as needed for Muscle spasms      calcium carbonate (CALCIUM 600) 600 MG TABS tablet Take 2 tablets by mouth daily 180 tablet 3    vitamin D3 (CHOLECALCIFEROL) 25 MCG (1000 UT) TABS tablet Take 1 tablet by mouth daily 90 tablet 3    DULoxetine (CYMBALTA) 30 MG extended release capsule Take 30 mg by mouth daily (Patient not taking: Reported on 2022)       No current facility-administered medications for this visit.       No Known Allergies          There is no immunization history on file for this patient.    Review of Systems   Constitutional: Negative.  Negative for fatigue.   Respiratory: Negative.  Negative for shortness of breath.    Gastrointestinal: Negative.  Negative for abdominal pain, constipation, diarrhea, nausea and vomiting.   Genitourinary: Negative.    Neurological:

## 2025-03-07 LAB
COMMENT: NORMAL
HPV OTHER HR TYPES: NORMAL
HPV TYPE 16: NORMAL
HPV TYPE 18: NORMAL

## 2025-03-12 LAB
COMMENT: NORMAL
HPV OTHER HR TYPES: NOT DETECTED
HPV TYPE 16: NOT DETECTED
HPV TYPE 18: NOT DETECTED

## 2025-03-13 LAB — GYNECOLOGY CYTOLOGY REPORT: NORMAL

## 2025-04-03 ENCOUNTER — HOSPITAL ENCOUNTER (OUTPATIENT)
Dept: PULMONOLOGY | Age: 55
Discharge: HOME | End: 2025-04-03
Attending: INTERNAL MEDICINE
Payer: MEDICARE

## 2025-04-03 PROCEDURE — 94726 PLETHYSMOGRAPHY LUNG VOLUMES: CPT

## 2025-04-03 PROCEDURE — 94729 DIFFUSING CAPACITY: CPT

## 2025-04-03 PROCEDURE — 94060 EVALUATION OF WHEEZING: CPT

## (undated) DEVICE — TRAY PREP DRY W/ PREM GLV 2 APPL 6 SPNG 2 UNDPD 1 OVERWRAP

## (undated) DEVICE — TUBING INSUFFLATOR HEAT HI FLO SET PNEUMOCLEAR

## (undated) DEVICE — SYRINGE MED 10ML LUERLOCK TIP W/O SFTY DISP

## (undated) DEVICE — SEALER ENDOSCP L37CM NANO COAT BLNT TIP LAP DIV

## (undated) DEVICE — TROCAR: Brand: KII FIOS FIRST ENTRY

## (undated) DEVICE — PREMIUM WET SKIN PREP TRAY: Brand: MEDLINE INDUSTRIES, INC.

## (undated) DEVICE — CATHETER,URETHRAL,VINYL,MALE,16",16 FR: Brand: MEDLINE

## (undated) DEVICE — GLOVE ORANGE PI 7 1/2   MSG9075

## (undated) DEVICE — SUTURE SZ 0 27IN 5/8 CIR UR-6  TAPER PT VIOLET ABSRB VICRYL J603H

## (undated) DEVICE — TROCAR: Brand: KII® SLEEVE

## (undated) DEVICE — GLOVE SURG SZ 65 CRM LTX FREE POLYISOPRENE POLYMER BEAD ANTI

## (undated) DEVICE — GOWN,ECLIPSE,POLYRNF,BRTHSLV,L,30/CS: Brand: MEDLINE

## (undated) DEVICE — PACK,BASIC,SIRUS,V: Brand: MEDLINE

## (undated) DEVICE — LAVH/LAPAROSCOPY PACK: Brand: CONVERTORS

## (undated) DEVICE — COUNTER NDL 30 COUNT FOAM STRP SGL MAG

## (undated) DEVICE — TOWEL,OR,DSP,ST,BLUE,STD,6/PK,12PK/CS: Brand: MEDLINE

## (undated) DEVICE — SUTURE PDS II SZ 0 L27IN ABSRB VLT L26MM CT-2 1/2 CIR Z334H

## (undated) DEVICE — COVER MAYO STAND CLTH

## (undated) DEVICE — BASIC PACK: Brand: CONVERTORS

## (undated) DEVICE — TISSUE RETRIEVAL SYSTEM: Brand: INZII RETRIEVAL SYSTEM

## (undated) DEVICE — GLOVE SURG SZ 65 L12IN FNGR THK79MIL GRN LTX FREE

## (undated) DEVICE — DRAPE, LAVH, STERILE: Brand: MEDLINE

## (undated) DEVICE — MARKER SURG SKIN UTIL REGULAR/FINE 2 TIP W/ RUL AND 9 LBL

## (undated) DEVICE — NEEDLE HYPO 20GA L1.5IN YEL POLYPR HUB S STL REG BVL STR

## (undated) DEVICE — INTENDED FOR TISSUE SEPARATION, AND OTHER PROCEDURES THAT REQUIRE A SHARP SURGICAL BLADE TO PUNCTURE OR CUT.: Brand: BARD-PARKER ® STAINLESS STEEL BLADES

## (undated) DEVICE — SUTURE MCRYL SZ 4-0 L18IN ABSRB UD L19MM PS-2 3/8 CIR PRIM Y496G

## (undated) DEVICE — KIT,ANTI FOG,W/SPONGE & FLUID,SOFT PACK: Brand: MEDLINE

## (undated) DEVICE — KIT ANTIFOG SOL 6GM IPA DISP FOR ENDOSCP PROC FRED DEXIDE

## (undated) DEVICE — BANDAGE ADH W1XL3IN NAT FAB WVN FLX DURABLE N ADH PD SEAL

## (undated) DEVICE — GAUZE,SPONGE,4"X4",16PLY,XRAY,STRL,LF: Brand: MEDLINE

## (undated) DEVICE — GLOVE SURG SZ 6 CRM LTX FREE POLYISOPRENE POLYMER BEAD ANTI

## (undated) DEVICE — PAD MATERNITY CURITY ADH STRIP DISP

## (undated) DEVICE — GLOVE ORANGE PI 7   MSG9070